# Patient Record
Sex: FEMALE | Race: WHITE
[De-identification: names, ages, dates, MRNs, and addresses within clinical notes are randomized per-mention and may not be internally consistent; named-entity substitution may affect disease eponyms.]

---

## 2017-06-02 ENCOUNTER — HOSPITAL ENCOUNTER (OUTPATIENT)
Dept: HOSPITAL 47 - RADMAMWWP | Age: 62
Discharge: HOME | End: 2017-06-02
Payer: COMMERCIAL

## 2017-06-02 DIAGNOSIS — Z12.31: Primary | ICD-10-CM

## 2017-06-02 PROCEDURE — 77063 BREAST TOMOSYNTHESIS BI: CPT

## 2017-06-05 NOTE — MM
Reason for exam: screening  (asymptomatic).

Last mammogram was performed 2 years and 3 months ago.



History:

Patient is postmenopausal and is nulliparous.

Benign excisional biopsy of the left breast, January 30, 2001.  Excisional biopsy 

of the right breast.

Took hormonal contraceptives for 11 years beginning at age 19.



Physical Findings:

A clinical breast exam by your physician is recommended on an annual basis and 

results should be correlated with mammographic findings.



MG 3D Screening Mammo W/Cad

Bilateral CC and MLO view(s) were taken.

Prior study comparison: March 11, 2015, right breast MG diagnostic mammo RT w CAD.

August 29, 2014, right breast MG work up mamm w CAD RT.

The breast tissue is heterogeneously dense. This may lower the sensitivity of 

mammography.

Finding: There is a questionable 16 mm equal density (isodense), lobulated mass 

located 3 cm from the nipple in the 12 o'clock position of the left breast.

New finding since March 11, 2015 and August 29, 2014.





ASSESSMENT: Incomplete: need additional imaging evaluation, BI-RAD 0



RECOMMENDATION:

Ultrasound of the left breast.



[facility] will attempt to contact patient  to return for ultrasound.

## 2017-06-15 ENCOUNTER — HOSPITAL ENCOUNTER (OUTPATIENT)
Dept: HOSPITAL 47 - RADUSWWP | Age: 62
Discharge: HOME | End: 2017-06-15
Payer: COMMERCIAL

## 2017-06-15 DIAGNOSIS — R92.8: Primary | ICD-10-CM

## 2017-06-15 NOTE — USB
Reason for exam: additional evaluation requested from abnormal screening.



History:

Patient is postmenopausal and is nulliparous.

Benign excisional biopsy of the left breast, January 30, 2001.  Excisional biopsy 

of the right breast.

Took hormonal contraceptives for 11 years beginning at age 19.



Physical Findings:

Nurse did not find any significant physical abnormalities on exam.



US Breast Workup Limited LT

Left breast ultrasound demonstrates no cystic or solid lesion seen. Possible scar 

tissue at palpable consistent with previousl surgery.



These results were verbally communicated with the patient and result sheet given 

to the patient on 6/15/17.





ASSESSMENT: Probably benign, BI-RAD 3



RECOMMENDATION:

Follow-up diagnostic mammogram and ultrasound of the left breast in 6 months.

## 2018-01-22 ENCOUNTER — HOSPITAL ENCOUNTER (OUTPATIENT)
Dept: HOSPITAL 47 - RADMAMWWP | Age: 63
Discharge: HOME | End: 2018-01-22
Payer: COMMERCIAL

## 2018-01-22 DIAGNOSIS — L98.9: ICD-10-CM

## 2018-01-22 DIAGNOSIS — R92.2: Primary | ICD-10-CM

## 2018-01-22 PROCEDURE — 77065 DX MAMMO INCL CAD UNI: CPT

## 2018-01-22 PROCEDURE — 76641 ULTRASOUND BREAST COMPLETE: CPT

## 2018-01-22 NOTE — MM
Reason for exam: follow-up at short interval from prior study.

Last mammogram was performed 8 months ago.



History:

Patient is postmenopausal and is nulliparous.

Benign excisional biopsy of the left breast, January 30, 2001.  Excisional biopsy 

of the right breast.

Took hormonal contraceptives for 11 years beginning at age 19.



Physical Findings:

Nurse did not find any significant physical abnormalities on exam.



MG 3D Diag Mammo W/Cad LT

CC and MLO view(s) were taken of the left breast.

Prior study comparison: June 2, 2017, bilateral MG 3d screening mammo w/cad.  

March 11, 2015, right breast MG diagnostic mammo RT w CAD.

The breast tissue is heterogeneously dense. This may lower the sensitivity of 

mammography.  There is no discrete abnormality including area of concern. Stable 

post operative distortion left breast.



These results were verbally communicated with the patient and result sheet given 

to the patient on 1/22/18.





ASSESSMENT: Incomplete: need additional imaging evaluation, BI-RAD 0



RECOMMENDATION:

Ultrasound of the left breast.

## 2018-01-22 NOTE — USB
Reason for exam: follow-up at short interval from prior study.



History:

Patient is postmenopausal and is nulliparous.

Benign excisional biopsy of the left breast, January 30, 2001.  Excisional biopsy 

of the right breast.

Took hormonal contraceptives for 11 years beginning at age 19.



US Breast LT

Left breast ultrasound includes all four quadrants, the retroareolar region and 

axilla. Finding demonstrates a 5 x 2 x 3mm oval lesion too small to characterize 

at 2 o'clock.



These results were verbally communicated with the patient and result sheet given 

to the patient on 1/22/18.





ASSESSMENT: Benign, BI-RAD 2



RECOMMENDATION:

Return to routine screening mammogram schedule for both breasts.

Back on schedule.

## 2018-10-02 ENCOUNTER — HOSPITAL ENCOUNTER (OUTPATIENT)
Dept: HOSPITAL 47 - RADMAMWWP | Age: 63
Discharge: HOME | End: 2018-10-02
Attending: OBSTETRICS & GYNECOLOGY
Payer: COMMERCIAL

## 2018-10-02 DIAGNOSIS — Z12.31: Primary | ICD-10-CM

## 2018-10-02 PROCEDURE — 77063 BREAST TOMOSYNTHESIS BI: CPT

## 2018-10-02 PROCEDURE — 77067 SCR MAMMO BI INCL CAD: CPT

## 2018-10-04 NOTE — MM
Reason for exam: screening  (asymptomatic).

Last mammogram was performed 8 months ago.



History:

Patient is postmenopausal and is nulliparous.

Benign excisional biopsy of the left breast, January 30, 2001.  Excisional biopsy 

of the right breast.

Took hormonal contraceptives for 11 years beginning at age 19.



Physical Findings:

A clinical breast exam by your physician is recommended on an annual basis and 

results should be correlated with mammographic findings.



MG 3D Screening Mammo W/Cad

Bilateral CC and MLO view(s) were taken.

Prior study comparison: January 22, 2018, left breast MG 3d diag mammo w/cad LT.  

June 2, 2017, bilateral MG 3d screening mammo w/cad.

The breast tissue is heterogeneously dense. This may lower the sensitivity of 

mammography.  Stable calcifications.  No significant changes when compared with 

prior studies.





ASSESSMENT: Benign, BI-RAD 2



RECOMMENDATION:

Routine screening mammogram of both breasts in 1 year.

## 2019-10-28 ENCOUNTER — HOSPITAL ENCOUNTER (OUTPATIENT)
Dept: HOSPITAL 47 - RADMAMWWP | Age: 64
Discharge: HOME | End: 2019-10-28
Attending: OBSTETRICS & GYNECOLOGY
Payer: COMMERCIAL

## 2019-10-28 DIAGNOSIS — Z12.31: Primary | ICD-10-CM

## 2019-10-28 PROCEDURE — 77063 BREAST TOMOSYNTHESIS BI: CPT

## 2019-10-28 PROCEDURE — 77067 SCR MAMMO BI INCL CAD: CPT

## 2019-10-30 NOTE — MM
Reason for exam: screening  (asymptomatic).

Last mammogram was performed 1 year and 1 month ago.



History:

Patient is postmenopausal and is nulliparous.

Benign excisional biopsy of the left breast, January 30, 2001.  Excisional biopsy 

of the right breast.

Took hormonal contraceptives for 11 years beginning at age 19.



Physical Findings:

A clinical breast exam by your physician is recommended on an annual basis and 

results should be correlated with mammographic findings.



MG 3D Screening Mammo W/Cad

Bilateral CC and MLO view(s) were taken.

Prior study comparison: October 2, 2018, bilateral MG 3d screening mammo w/cad.  

January 22, 2018, left breast MG 3d diag mammo w/cad LT.

There is chronic nodularity in the left breast inferiorly. Nodular asymmetric 

density lateral posterior left breast is more defined. Questionable subtle 

distortion 11-12 o'clock right breast.





ASSESSMENT: Incomplete: need additional imaging evaluation, BI-RAD 0



RECOMMENDATION:

Special view mammogram of both breasts.

(3D)





If lesion persists on supplemental views, image directed ultrasound is 

recommended.



[facility] will attempt to contact patient to return for supplemental 

views and ultrasound if indicated.

## 2019-11-07 ENCOUNTER — HOSPITAL ENCOUNTER (OUTPATIENT)
Dept: HOSPITAL 47 - RADMAMWWP | Age: 64
Discharge: HOME | End: 2019-11-07
Attending: OBSTETRICS & GYNECOLOGY
Payer: COMMERCIAL

## 2019-11-07 DIAGNOSIS — R92.8: Primary | ICD-10-CM

## 2019-11-07 PROCEDURE — 77062 BREAST TOMOSYNTHESIS BI: CPT

## 2019-11-07 PROCEDURE — 77066 DX MAMMO INCL CAD BI: CPT

## 2019-11-08 NOTE — USB
Reason for exam: additional evaluation requested from abnormal screening.



History:

Patient is postmenopausal and is nulliparous.

Benign excisional biopsy of the left breast, January 30, 2001.  Excisional biopsy 

of the right breast.

Took hormonal contraceptives for 11 years beginning at age 19.



US Breast Workup Limited ENDY

Left limited breast ultrasound including focal area of concern, retroareolar and 

axilla demonstrates a 4 x 3mm echogenic area within duct at the posterior nipple, 

ultrasound biopsy recommended.



Right limited breast ultrasound including focal area of concern, retroareolar and 

axilla demonstrates no cystic or solid lesion seen. On screening right 3D CC 46/80

on review there are two areas of distortion 1.6cm apart. Stereotactic core biopsy

of more posterior recomended. ML diagnostic 40/78.



These results were verbally communicated with the patient and result sheet given 

to the patient on 11/7/19.





ASSESSMENT: Suspicious, BI-RAD 4



RECOMMENDATION:

Ultrasound core biopsy of the left breast.

Stereotactic core biopsy of the right breast.



Called Dr. Burrows's office with mammographic findings and has scheduled an 

appointment for the patient for 11/14/19 at 4:00 with Dr. Miller.





PRELIMINARY REPORT CALLED AND FAXED TO DR. MILLER ON 11/7/19.

## 2019-11-08 NOTE — MM
Reason for exam: additional evaluation requested from abnormal screening.

Last mammogram was performed less than 1 month ago.



History:

Patient is postmenopausal and is nulliparous.

Benign excisional biopsy of the left breast, January 30, 2001.  Excisional biopsy 

of the right breast.

Took hormonal contraceptives for 11 years beginning at age 19.



Physical Findings:

Nurse did not find any significant physical abnormalities on exam.



MG 3D Work Up W/Cad ENDY

Bilateral spot compression CC and LM view(s) were taken.  Spot compression MLO 

view(s) were taken of the right breast.

Prior study comparison: October 28, 2019, bilateral MG 3d screening mammo w/cad.  

October 2, 2018, bilateral MG 3d screening mammo w/cad.

The breast tissue is heterogeneously dense. This may lower the sensitivity of 

mammography.  Post excisional changes on the left retroareolar and likely also 

upper outer quadrant at posterior depth 12 o'clock, precautionary ultrasound. 6mm 

area of architectural distortion in right upper outer quadrant 6cm from nipple, 

persists on additional views.



These results were verbally communicated with the patient and result sheet given 

to the patient on 11/7/19.





ASSESSMENT: Incomplete: need additional imaging evaluation, BI-RAD 0



RECOMMENDATION:

Ultrasound of both breasts.

(both upper outer quadant)

## 2019-11-14 ENCOUNTER — HOSPITAL ENCOUNTER (OUTPATIENT)
Dept: HOSPITAL 47 - WWCWWP | Age: 64
End: 2019-11-14
Attending: SURGERY
Payer: COMMERCIAL

## 2019-11-14 VITALS
DIASTOLIC BLOOD PRESSURE: 97 MMHG | TEMPERATURE: 98.3 F | SYSTOLIC BLOOD PRESSURE: 165 MMHG | HEART RATE: 87 BPM | RESPIRATION RATE: 18 BRPM

## 2019-11-14 VITALS — BODY MASS INDEX: 28.8 KG/M2

## 2019-11-14 DIAGNOSIS — Z53.9: Primary | ICD-10-CM

## 2019-11-14 NOTE — P.GSHP
History of Present Illness


H&P Date: 11/14/19


Chief Complaint: Abnormal mammogram and ultrasound








Usha is being seen in consultation today for Dr. Kaycee Beckman.  This is for 

bilateral abnormal mammograms and an abnormal right breast ultrasound.


A 64-year-old white female who presents for breast evaluation.  She underwent a 

bilateral screening mammogram on [id number] [id number].  This was felt to be incomplete and

bilateral diagnostic mammograms were recommended.  Bilateral diagnostic 

mammograms were done 02051 and these were felt to be incomplete and bilateral 

ultrasounds were recommended.  On ultrasound she was noted to have the following


Right breast: Focal area of concern retroareolar and axilla demonstrated no cy

stic or solid lesion.  On review there were 2 areas of distortion 1.6 cm apart a

stereotactic core biopsy of the more posterior lesion was recommended.  It was 

felt that a 3-D stereo biopsy should be performed and this is scheduled at [facility] December 7.


 The left breast: 4 x 3 mm echogenic area within a duct at the posterior nipple 

ultrasound biopsy recommended this is scheduled for [facility] on 

the same date December 7.





The patient does not feel anything of concern in her breast.  She has no 

complaints of pain masses nipple discharge or skin changes.


In 1992 an open biopsy was done of the right breast. This was benign.  2001 open

biopsy of the left breast. 2004 left breast of left breast.  All benign. 





Family History:


father: Esophageal cancer





Hormonal History:


menarche: 13


G2Ab2, no children (tubal at 30) 


menopause: 43


BCP: 15 years


hormones: none





Surgical history:


1.  Tubal ligation


2.  Bunionectomy





Medical history:


1.varicose veins








Social History:


smoke: none


alcohol: wine daily


drugs: none 








- Constitutional


Constitutional: Denies chills, Denies fever





- EENT


Eyes: denies blurred vision, denies pain


Ears: bilateral: tinnitus, deny: decreased hearing


Ears, nose, mouth and throat: Denies headache, Denies sore throat





- Breasts


Breasts: bilateral: as per HPI





- Cardiovascular


Cardiovascular: Reports high blood pressure





- Respiratory


Respiratory: Denies cough, Denies 7





- Gastrointestinal


Gastrointestinal: Denies abdominal pain, Denies diarrhea, Denies nausea, Denies 

vomiting





- Genitourinary (Female)


Genitourinary: Denies dysuria, Denies hematuria





- Menstruation


Menstruation: Reports postmenopausal





- Musculoskeletal


Comment: 





arthritis





- Integumentary


Integumentary: Denies pruritus, Denies rash





- Neurological


Neurological: Denies numbness, Denies weakness





- Psychiatric


Psychiatric: Denies anxiety, Denies depression





- Endocrine


Endocrine: Denies fatigue, Denies weight change





- Hematologic/Lymphatic


Comment: 





none





- Allergic/Immunologic


Allergic/Immunologic: Reports seasonal allergies





Past Medical History


History of Any Multi-Drug Resistant Organisms: None Reported


Smoking Status: Former smoker





Medications and Allergies


                                Home Medications











 Medication  Instructions  Recorded  Confirmed  Type


 


Cholecalciferol (Vitamin D3) 2,000 unit PO DAILY 11/14/19 11/14/19 History





[Vitamin D3]    


 


Famotidine 40 mg PO DAILY 11/14/19 11/14/19 History


 


Gabapentin [Neurontin] 300 mg PO TID 11/14/19 11/14/19 History


 


Levothyroxine Sodium [Synthroid] 75 mcg PO DAILY 11/14/19 11/14/19 History


 


Loratadine [Claritin] 10 mg PO DAILY 11/14/19 11/14/19 History


 


Metoprolol Tartrate [Lopressor] 100 mg PO BID 11/14/19 11/14/19 History


 


Milk Thistle 150 mg PO DAILY 11/14/19 11/14/19 History


 


Omega-3 Fatty Acids [Omega-3] 1,000 mg PO DAILY 11/14/19 11/14/19 History


 


Vitamin B Complex 1 each PO DAILY 11/14/19 11/14/19 History


 


Vitamin E 1,000 unit PO DAILY 11/14/19 11/14/19 History


 


traMADol HCL [Ultram] 50 mg PO Q4-6H 11/14/19 11/14/19 History








                                    Allergies











Allergy/AdvReac Type Severity Reaction Status Date / Time


 


No Known Allergies Allergy   Verified 11/14/19 17:04














Surgical - Exam


                                   Vital Signs











Temp Pulse Resp BP Pulse Ox


 


 98.3 F   87   18   165/97   97 


 


 11/14/19 17:01  11/14/19 17:01  11/14/19 17:01  11/14/19 17:01  11/14/19 17:01














- General


well developed, well nourished, no distress





- Eyes


normal ocular movement





- ENT


no hearing loss, no congestion





- Neck


no masses, trachea midline





- Respiratory


normal expansion, normal respiratory effort, clear to auscultation





- Cardiovascular


Rhythm: regular


Heart Sounds: normal: S1, S2





- Abdomen





normal bowel sounds


Abdomen: soft, non tender, no guarding, no rigid, no rebound





- Integumentary


no rash





- Neurologic


no disoriented, no combative





- Musculoskeletal


normal gait





- Psychiatric


oriented to time, oriented to person, oriented to place, speech is normal, 

memory intact





Breast examination:


Right breast: Multiple positional exam no dominant masses or nodules of concern


Right axilla: No adenopathy of concern


Left breast: Well-healed scar from prior surgery no dominant masses or nodules 

of concern


Left axilla: No adenopathy of concern


Bra: 40DD


ptosi grade2/3





Results





Mammogram and ultrasound results reviewed





Assessment and Plan


Assessment: 





Impression:


1.  Abnormal bilateral mammogram


2.  Abnormal right breast ultrasound


3.  Fibrocystic breast changes


4.  Family history of cancer


5. varicose veins


6. HTN





Plan


1. ultrasound core biopsy right breast


2.  Stereotactic core biopsy left breast 1 possibly 2 spots


3.  Medical management of medical conditions


4.  Follow-up after core biopsies performed





We discussed risks and benefits of the procedure.  We've also discussed stopping

 in the aspirin and aspirin products vitamin E or mated 3 Fish oils.  The 

patient understands and the procedure has been scheduled for the near future.





Time 25 minutes, 50% discussion. 





CC: Dr. Beckman

## 2019-12-13 ENCOUNTER — HOSPITAL ENCOUNTER (OUTPATIENT)
Dept: HOSPITAL 47 - WWCWWP | Age: 64
Discharge: HOME | End: 2019-12-13
Attending: SURGERY
Payer: COMMERCIAL

## 2019-12-13 VITALS
SYSTOLIC BLOOD PRESSURE: 134 MMHG | TEMPERATURE: 98 F | RESPIRATION RATE: 18 BRPM | DIASTOLIC BLOOD PRESSURE: 82 MMHG | HEART RATE: 66 BPM

## 2019-12-13 DIAGNOSIS — Z53.9: Primary | ICD-10-CM

## 2019-12-13 NOTE — P.PN
Subjective


Progress Note Date: 12/13/19


Principal diagnosis: 





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

breast


Stage 1A [id number]+/Pr+G2 Her2-


right  breast: Stage IA [id number]+Pr+Her2-G2; left breast: atypia





Usha is a 64-year-old white female who was seen in consultation 11-14-19 for 

radiographic abnormality in both the right and left breast.  She underwent a 

right breast stereotactic core biopsy of 2 areas and [id number].  She underwent an 

ultrasound-guided core biopsy of the left breast on the same day.  The breast 

stereotactic core biopsy revealed in the 11 to 12 o'clock position in the 

posterior region ductal carcinoma in situ, intermediate interposition invasive 

ductal carcinoma.  The radiograph was reviewed with Dr. Alexis and it is felt 

that the area of concern is between 1-2 cm in size.  The left breast 

retroareolar core biopsy revealed atypical ductal hyperplasia.  The patient has 

no complaints related to the biopsy procedure.  The patient is here with her 

 for discussion.





Objective





- Vital Signs


Vital signs: 


                                   Vital Signs











Temp  98.0 F   12/13/19 07:38


 


Pulse  66   12/13/19 07:38


 


Resp  18   12/13/19 07:38


 


BP  134/82   12/13/19 07:38


 


Pulse Ox  97   12/13/19 07:38








                                 Intake & Output











 12/12/19 12/13/19 12/13/19





 18:59 06:59 18:59


 


Weight   86.183 kg














- Exam





BMI 28.1





- Constitutional


General appearance: Present: average body habitus





- EENT


Eyes: Present: EOMI


ENT: Present: hearing grossly normal





- Neck


Neck: Present: normal ROM





- Respiratory


Respiratory: bilateral: CTA





- Cardiovascular


Rhythm: regular


Heart sounds: normal: S1, S2





- Integumentary


Integumentary Comment(s): 





Mild ecchymosis right breast at core biopsy site, no evidence of infection or 

hematoma


Left breast core biopsy site no evidence of infection or hematoma





- Musculoskeletal


Musculoskeletal: Present: gait normal





- Psychiatric


Psychiatric: Present: A&O x's 3, appropriate affect, intact judgment & insight





Assessment and Plan


Assessment: 





Impression:


1.  Right breast core biopsy of 2 sites stereotactic core biopsy posterior 

position ductal carcinoma in situ, anterior position invasive ductal carcinoma


2.  Left breast core biopsy atypia retroareolar region


3.  Family history father esophageal cancer


4.  History of hypertension





 At had a long discussion with the patient and her  regarding treatment 

options.  The lesion in the right breast appears to be less than 2 cm in size 

and she would be a good candidate for a lumpectomy with radiation therapy.  

Options of mastectomy plus or minus reconstruction were also discussed.  We have

also discussed sentinel node biopsy and possible axillary node dissection.  

After discussion they wish to proceed with a lumpectomy, sentinel node biopsy, 

possible axillary node dissection.  We have discussed using an onco plastic 

approach and most likely this will be done via an eccentric mastopexy.


We discussed that there will  be some asymmetry between the breasts.  She will 

also need a left breast needle local excisional biopsy of the area of atypia.  

The right breast will have additional changes after radiation therapy and a 

symmetry procedure on the contralateral side would be delayed until this was don

e.





Plan:


1.  Needle localization lumpectomy right breast with possible tissue transfer, 

sentinel node biopsy, possible axillary node dissection


2.  Needle localization lumpectomy left breast area of atypia


3.  Presentation of case at tumor board


4.  Preoperative medical clearance





Approximately 60 minutes spent in this encounter, greater than 50% planning and 

counseling.





Cc: Dr. Chandra

## 2020-01-15 ENCOUNTER — HOSPITAL ENCOUNTER (OUTPATIENT)
Dept: HOSPITAL 47 - OR | Age: 65
Discharge: HOME | End: 2020-01-15
Attending: SURGERY
Payer: COMMERCIAL

## 2020-01-15 VITALS — BODY MASS INDEX: 28 KG/M2

## 2020-01-15 VITALS — RESPIRATION RATE: 16 BRPM

## 2020-01-15 VITALS — TEMPERATURE: 97.1 F

## 2020-01-15 VITALS — DIASTOLIC BLOOD PRESSURE: 74 MMHG | HEART RATE: 79 BPM | SYSTOLIC BLOOD PRESSURE: 140 MMHG

## 2020-01-15 DIAGNOSIS — Z79.891: ICD-10-CM

## 2020-01-15 DIAGNOSIS — I10: ICD-10-CM

## 2020-01-15 DIAGNOSIS — Z91.048: ICD-10-CM

## 2020-01-15 DIAGNOSIS — K21.9: ICD-10-CM

## 2020-01-15 DIAGNOSIS — Z79.899: ICD-10-CM

## 2020-01-15 DIAGNOSIS — Z79.890: ICD-10-CM

## 2020-01-15 DIAGNOSIS — C50.911: Primary | ICD-10-CM

## 2020-01-15 DIAGNOSIS — Z88.8: ICD-10-CM

## 2020-01-15 PROCEDURE — 88307 TISSUE EXAM BY PATHOLOGIST: CPT

## 2020-01-15 PROCEDURE — 76098 X-RAY EXAM SURGICAL SPECIMEN: CPT

## 2020-01-15 PROCEDURE — 38792 RA TRACER ID OF SENTINL NODE: CPT

## 2020-01-15 PROCEDURE — 88341 IMHCHEM/IMCYTCHM EA ADD ANTB: CPT

## 2020-01-15 PROCEDURE — 19281 PERQ DEVICE BREAST 1ST IMAG: CPT

## 2020-01-15 PROCEDURE — 88342 IMHCHEM/IMCYTCHM 1ST ANTB: CPT

## 2020-01-15 PROCEDURE — 19282 PERQ DEVICE BREAST EA IMAG: CPT

## 2020-01-15 RX ADMIN — HYDROMORPHONE HYDROCHLORIDE PRN MG: 1 INJECTION, SOLUTION INTRAMUSCULAR; INTRAVENOUS; SUBCUTANEOUS at 15:49

## 2020-01-15 RX ADMIN — HYDROMORPHONE HYDROCHLORIDE PRN MG: 1 INJECTION, SOLUTION INTRAMUSCULAR; INTRAVENOUS; SUBCUTANEOUS at 15:55

## 2020-01-15 NOTE — P.NAPBC
NAPBC Queries





- NAPBC Queries


Was patient's case review presented at [facility] tumor board? If no, comment.: Yes


Was patient's pathology reviewed at [facility]? If no, comment.: Yes


Was breast conservation surgery offered? If no, comment.: Yes


Was sentinel node biopsy offered? If no, comment.: Yes


Was diagnosis confirmed by percutaneous core biopsy? If no, comment.: Yes


Is patient mastectomy patient?: No


Was a preop referral to reconstructive surgeon offered?: No (not a mastectomy 

patient)


Clinical Stage: 





stage 1A

## 2020-01-15 NOTE — P.OP
Date of Procedure: 01/15/20


Preoperative Diagnosis: 


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 

carcinoma in situ, and the second area invasive ductal carcinoma


Postoperative Diagnosis: 


same


Procedure(s) Performed: 


Left breast needle local excisional biopsy


Right breast sentinel node biopsy


Right breast needle local times two lumpectomy via donut mastopexy with adjacent

tissue transfer


Anesthesia: JED


Surgeon: Kerline Miller


Estimated Blood Loss (ml): 5


IV fluids (ml): 1,000


Pathology: other (breast tissue right and left breast, sentinal node biopsy)


Condition: stable


Disposition: same day


Indications for Procedure: 


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 

ductal carcinoma


Operative Findings: 


Dense breast tissue


Description of Procedure: 


the patient is a 64-year-old white female who


Mammographic evaluation of the breast was noted to have an area in both rest 

which required biopsy.  In the left breast core biopsy revealed atypia.  In the 

right breast 2 areas of concern were identified when revealed ductal carcinoma 

in situ and the second invasive ductal carcinoma.  The 2 areas in the right 

breast with approximately 1.6 cm apart.


Patient was given risks and benefits of surgery and chose to have a right 

lumpectomy via donor mastopexy and a left needle local excisional biopsy.





The patient was taken to the operating room following needle localization of the

areas of concern in each breast.  Additionally radioactive substance was 

injected in the right periareolar area such that the sentinel node biopsy could 

be performed on the right.


In the operative suite and induction of general anesthesia was initiated.  

Following this the neoprobe was utilized to assure the radioactivity had been 

transmitted to the right axilla.  This was identified.  The breast and the right

axilla were prepped and draped in a sterile fashion.  The left breast was 

approached initially.  A periareolar incision was made and carried down to the 

shaft of the needle and surrounding tissue was excised.  The specimen was inked 

for orientation.  Radiograph revealed the area of concern had been removed.  The

deep tissues were closed with 3-0 Vicryl suture.  The skin was closed with 4-0 

Monocryl.





The right breast and axilla were then approached.  The right axilla was noted to

have an area of increased radioactivity at the medial aspect of the axilla.  An 

incision was made and carried down to the axillary tissue.  Using the Allis 

clamp the area of greatest activity was grasped.  Using the harmonic scalpel 

dissection was performed around this.  The neoprobe was used to test for 

radioactivity and the 10 second count of this lymph node was 4828.  The 10 

second background count was 36.  The wound was examined for hemostasis.  After 

assured that hemostasis was attained the deep tissues were closed with 3-0 

Vicryl suture.  The lymph node was felt to be in the level I area of dissection.

 The skin was closed using 4-0 Monocryl.  Steri-Strips were applied.





The area of the right breast was then approached.  A donut mastopexy 

circumareolar caryn was placed.  A second eccentric surgical caryn was placed 

outside of this [place].  The distance from the periareolar edge to the outer 

[place] was approximately 1 cm except at the superiormost aspect it was 1.8 cm.  

This tissue was de-epithelialized.  Following this the breast parenchyma was 

entered at the superior lateral aspect of the incision.  The breast tissue was 

dissected free in the plane between the subcutaneous tissue and the breast 

tissue.  This was dissected to the area of the clavicle superior and to the 

chest wall medially and laterally.  The wires which had both been inserted 

laterally were identified.  The tissue between these wires was excised using 

sharp dissection.  Posteriorly excision was carried down to the pectoralis major

muscle.  The specimen was painted for orientation.  Radiograph of the specimen 

revealed the area of concern had been removed.   The cavity was marked using 

titanium clips.  Tissue mobilization both medially and laterally was performed 

posteriorly on the pectoralis major muscle.  The area of most mobilization was 

approximately 80 cm squared medially and 80 cm squared laterally.  This was a 

total of 160 cm squared which was mobilized.  The deep tissues were closed in 

the midline using a Vicryl suture.  Following this a 5-0 Thornton-Earl pursestring 

suture was placed around the outer [place]. This was tightened to the correct 

size.  It was a diameter of approximately 5.5 cm.  [place] sutures of 5-0 Thornton-

Earl were placed between the outer and inner [place].   This was followed by a 

running 4-0 Monocryl subcuticular suture.  A 4-0 nylon skin suture was then 

placed.





At the end of the case approximately 20 mL of 1% lidocaine were injected 10 at 

the periareolar incision in the right breast, 5 in the axillary incision, and 5 

in the left breast incision.  A new needle and syringe was used to inject the 

left breast site.





The patient tolerated the procedure in stable condition.  All instrument and 

sponge counts were correct at the end of the case.

## 2020-01-15 NOTE — NM
EXAMINATION TYPE: NM sentinel node injection

 

DATE OF EXAM: 1/15/2020

 

COMPARISON: NONE

 

INDICATION: Abnormal mammogram.

 

Informed consent was obtained.  A timeout was performed.  

 

The area around the right nipple was cleansed with alcohol.  In single subdermal injection, a total o
f 515 uCi Technetium 99m Lymphoseek was injected.  The patient tolerated the procedure very well.  

 

IMPRESSIONS:

 

1. Successful injection for sentinel node evaluation.

## 2020-01-15 NOTE — P.DS
Providers


Attending physician: 


Kerline Miller





Primary care physician: 


Kaycee Beckman








Plan - Discharge Summary


Discharge Rx Participant: No


New Discharge Prescriptions: 


No Action


   traMADol HCL [Ultram] 50 mg PO Q4-6H


   Metoprolol Tartrate [Lopressor] 100 mg PO BID


   Loratadine [Claritin] 10 mg PO DAILY


   Levothyroxine Sodium [Synthroid] 75 mcg PO DAILY


   Gabapentin [Neurontin] 300 mg PO TID


   Famotidine 40 mg PO DAILY


   Vitamin E 1,000 unit PO DAILY


   Vitamin B Complex 1 each PO DAILY


   Omega-3 Fatty Acids [Omega-3] 1,000 mg PO DAILY


   Milk Thistle 150 mg PO DAILY


   Cholecalciferol (Vitamin D3) [Vitamin D3] 2,000 unit PO DAILY


Discharge Medication List





Cholecalciferol (Vitamin D3) [Vitamin D3] 2,000 unit PO DAILY 11/14/19 [History]


Famotidine 40 mg PO DAILY 11/14/19 [History]


Gabapentin [Neurontin] 300 mg PO TID 11/14/19 [History]


Levothyroxine Sodium [Synthroid] 75 mcg PO DAILY 11/14/19 [History]


Loratadine [Claritin] 10 mg PO DAILY 11/14/19 [History]


Metoprolol Tartrate [Lopressor] 100 mg PO BID 11/14/19 [History]


Milk Thistle 150 mg PO DAILY 11/14/19 [History]


Omega-3 Fatty Acids [Omega-3] 1,000 mg PO DAILY 11/14/19 [History]


Vitamin B Complex 1 each PO DAILY 11/14/19 [History]


Vitamin E 1,000 unit PO DAILY 11/14/19 [History]


traMADol HCL [Ultram] 50 mg PO Q4-6H 11/14/19 [History]








Follow up Appointment(s)/Referral(s): 


Kerline Miller MD [STAFF PHYSICIAN] - 1 Week


Activity/Diet/Wound Care/Special Instructions: 


do not drive for 24 hours after discharge or if taking any narcotic pain 

medication


may shower after 48 hours


Wear bra at all times





Discharge Disposition: HOME SELF-CARE

## 2020-01-15 NOTE — MM
EXAMINATION TYPE: MG pre op needle loc LT

 

DATE OF EXAM: 1/15/2020

 

COMPARISON: Mammogram 12/5/2019

 

CLINICAL HISTORY: DCIS and invasive carcinoma right breast, atypical biopsy 
results left breast

 

TECHNIQUE: Needle localization with wire placement and surgical excision of area
of concern of the bilateral breasts

 

FINDINGS: The procedure of needle localization with wire placement and than 
surgical excision was explained to the patient.  Benefits, alternatives, and 
risks were discussed.  An informed consent was then obtained.  

 

A timeout was performed.

 

The shortest pathway for procedure was chosen.  Shortest pathway was lateral 
approach. The overlying skin was prepped in usual sterile fashion.  1% Lidocaine
was used as anesthetic into the skin and subcutaneous tissue up to the level of 
area of concern.  A 7 cm needle was used.  It was placed via a lateral approach 
under mammographic guidance.  Subsequent 90 degrees mammogram show the needle to
be in satisfactory position relative to the targeted area.  At this point, wire 
was placed and the needle was withdrawn.  The wire was fixed to patient's skin. 
Images were marked for surgeon.  

 

The shortest pathway for procedure was chosen.  Shortest pathway was lateral 
approach. The overlying skin was prepped in usual sterile fashion.  1% Lidocaine
was used as anesthetic into the skin and subcutaneous tissue up to the level of 
areas of concern.  A 7 cm needle was used for the more anterior inferior marker 
and 9 cm needle was used for the more posterior superior marker. These were 
placed via a lateral approach under mammographic guidance.  Subsequent 90 
degrees mammogram show the needles to be in satisfactory position relative to 
the targeted areas.  At this point, wire was placed and the needles were 
withdrawn.  The wires were fixed to patient's skin.  Images were marked for 
surgeon.  

 

Images were reviewed with the surgeon prior to surgery in person. 

 

The patient subsequently had sentinel node injection. The patient tolerated the 
procedures well without any immediate complication.  The patient was taken to 
presurgical holding and then to surgery for surgical excision.

 

Specimen: Left breast specimen is obtained. The surgical wire and clip are 
within the specimen.

 

Specimen: Right breast specimen is obtained. Surgical wires and clips are within
the specimen.

 

IMPRESSION:

1. Surgical biopsy left breast.

2. Lumpectomy, 2 sites right breast.

 

Recommendations:

1. Recommendations are pending pathology results.

 

Pathology Results: Malignant



A. RIGHT BREAST/AXILLA, SENTINEL LYMPH NODE, BIOPSY: One lymph node negative for
metastatic adenocarcinoma. Node is examined by routine H+E as well as 
appropriately controlled immunohistochemical stains for cytokeratin 7 and 
epithelial membrane antigen.



B. RIGHT BREAST/AXILLA LYMPH NODE, "LYMPH NODE DISSECTION": Benign fibroadipose 
tissue, negative for lymph node.



C. RIGHT BREAST TISSUE, BIOPSY: Fibrocystic spectrum changes and sclerosing 
adenosis. Negative for invasive or in situ adenocarcinoma.



D. RIGHT BREAST MASS, NEEDLE LOCALIZATION MAMMOGRAPHICALLY ORIENTED LUMPECTOMY: 
Focal infiltrating moderately differentiated adenocarcinoma about 3 mm in 
greatest dimension and Grade 2 duct carcinoma in situ with focal comedo form 
necrosis measuring about 15 x 9 x 6 mm. Black inked superior margin is about 1 
mm away from DCIS, other margins - negative for involvement by adenocarcinoma, 
see note.



E. LEFT BREAST, MAMMOGRAPHICALLY ORIENTED BIOPSY: Focal atypical duct 
hyperplasia. Lesion does not contact inked margins.



Recommendation

Surgical consult

MTDD

## 2020-01-30 ENCOUNTER — HOSPITAL ENCOUNTER (OUTPATIENT)
Dept: HOSPITAL 47 - WWCWWP | Age: 65
Discharge: HOME | End: 2020-01-30
Attending: SURGERY
Payer: COMMERCIAL

## 2020-01-30 VITALS
TEMPERATURE: 98.4 F | SYSTOLIC BLOOD PRESSURE: 154 MMHG | RESPIRATION RATE: 16 BRPM | HEART RATE: 81 BPM | DIASTOLIC BLOOD PRESSURE: 93 MMHG

## 2020-01-30 DIAGNOSIS — Z53.9: Primary | ICD-10-CM

## 2020-01-30 NOTE — P.PN
Subjective


Progress Note Date: 01/30/20


Principal diagnosis: 





right breast STAGE 1 A, left breast atypia





Usha is a 64-year-old white female status post right breast lumpectomy and 

sentinel node biopsy and left breast excisional biopsy the right breast had a 

small focus of invasive ductal carcinoma 3 millimeters as well as DCIS.  The 

margins were negative although the DCIS was closes to the superior margin, less 

than 1 mm.  The patient has no complaints postoperatively.





Objective





- Vital Signs


Vital signs: 


                                   Vital Signs











Temp  98.4 F   01/30/20 16:27


 


Pulse  81   01/30/20 16:27


 


Resp  16   01/30/20 16:27


 


BP  154/93   01/30/20 16:27


 


Pulse Ox  99   01/30/20 16:27








                                 Intake & Output











 01/29/20 01/30/20 01/30/20





 18:59 06:59 18:59


 


Weight   87.09 kg














- Constitutional


General appearance: Present: average body habitus





- EENT


Eyes: Present: EOMI


ENT: Present: hearing grossly normal





- Respiratory


Respiratory: bilateral: CTA





- Cardiovascular


Rhythm: regular


Heart sounds: normal: S1, S2





- Integumentary


Integumentary Comment(s): 





Reason left breast: Clean and dry no evidence of infection


Incisions right breast and axilla clean and dry sutures to be removed








Assessment and Plan


Assessment: 


Impression::


  Patient status post excisional biopsy left breast, right breast lumpectomy and

sentinel node biopsy





Pathology revealed a 3 mm focus of infiltrating ductal carcinoma in the right 

breast with DCIS margin close at less than 1 mm superiorly no tumor on ink


Left breast focal atypical ductal hyperplasia lesion does not contact inked 

margins





The patient is going to follow with medical and radiation oncology.  Sutures to 

be removed.  She will follow her in 3 months.


Time with Patient: Less than 30

## 2020-03-02 ENCOUNTER — HOSPITAL ENCOUNTER (OUTPATIENT)
Dept: HOSPITAL 47 - RADCTMAIN | Age: 65
Discharge: HOME | End: 2020-03-02
Attending: RADIOLOGY
Payer: COMMERCIAL

## 2020-03-02 DIAGNOSIS — C50.411: Primary | ICD-10-CM

## 2020-03-02 DIAGNOSIS — R05: ICD-10-CM

## 2020-03-02 DIAGNOSIS — F17.210: ICD-10-CM

## 2020-03-02 PROCEDURE — 71250 CT THORAX DX C-: CPT

## 2020-03-02 NOTE — CT
EXAMINATION TYPE: CT chest wo con

 

DATE OF EXAM: 3/2/2020

 

COMPARISON: NONE

 

HISTORY: Cough and right sided chest pain. History of right sided breast cancer.

 

CT DLP: 294.3 mGycm.  Automated Exposure Control for Dose Reduction was Utilized.

 

 

TECHNIQUE:  CT scan of the thorax is performed without IV contrast.

 

FINDINGS:

 

LUNGS: Mild biapical pleural/parenchymal scarring. Focal curvilinear and lobulated consolidation in t
he right lower lobe posteriorly just above the diaphragm extending superiorly. There is additional li
near scarring and atelectasis in both bases just above diaphragm. No concerning nodules or masses. No
 pleural effusion or pneumothorax. Mild central peribronchial wall thickening bilaterally.

 

MEDIASTINUM: Lack of IV contrast is noted to limit evaluation for mediastinal and especially hilar ad
enopathy. There are no definitive greater than 1 cm hilar or mediastinal lymph nodes.   No cardiomega
ly or pericardial effusion is seen. Ascending aorta measures up to 3.5 cm diameter image 27.

 

OTHER: Small hiatal hernia is present. Surgical clips right breast from prior lumpectomy seen central
ly. Additional scarring towards the right axilla is present.

 

 

IMPRESSION: Focal curvilinear lobulated consolidation posterior aspect right lower lobe mid to basila
r aspect could reflect focal pneumonia in appropriate clinical setting, correlate clinically.

## 2020-03-11 ENCOUNTER — HOSPITAL ENCOUNTER (OUTPATIENT)
Dept: HOSPITAL 47 - RADBDWWP | Age: 65
Discharge: HOME | End: 2020-03-11
Attending: INTERNAL MEDICINE
Payer: COMMERCIAL

## 2020-03-11 DIAGNOSIS — Z79.890: ICD-10-CM

## 2020-03-11 DIAGNOSIS — M85.80: Primary | ICD-10-CM

## 2020-03-11 DIAGNOSIS — N95.1: ICD-10-CM

## 2020-03-11 PROCEDURE — 77080 DXA BONE DENSITY AXIAL: CPT

## 2020-03-11 NOTE — BD
EXAMINATION TYPE: Axial Bone Density

 

DATE OF EXAM: 3/11/2020

 

COMPARISON: 02/13/2013

 

CLINICAL HISTORY: Z 79.890

 

Height:  67.75

Weight:  195

 

FRAX RISK QUESTIONS:

Alcohol (3 or more units per day):  no

Family History (Parent hip fracture):

Glucocorticoids (More than 3mos):  no

           (Ex: prednisone, prednisolone, methylprednisolone, dexamethasone, and hydrocortisone).    
     

History of Fracture in Adulthood: yes

Secondary Osteoporosis: 

  1.  Type 1 Diabetes: no

  2.  Hyperthyroidism: no

  3.  Menopause before 45: no

  4.  Malnutrition: no

  5.  Chronic liver disease: no

Rheumatoid Arthritis: no

Current Tobacco Use: no

 

RISK FACTORS 

HISTORY OF: 

Family History of Osteoporosis: yes

Active: yes

Diet low in dairy products/other sources of calcium:  at least one serving a day

Postmenopausal woman: yes

Take estrogen and/or progesterone medications: not now

How long: hormonal contraceptives age [age]

Lost more than 2 inches in height since high school: no

Frequent falls: no

Poor Health: no

Hyperparathyroidism: no

Adrenal Insufficiency: no

 

MEDICATIONS: 

Prednisone or other steroids: no

Thyroid Medications:  yes

Which medication: Levothyroxine

How Long: over 10 years

Osteoporosis Medications: no

Additional Medications: calcium, blood pressure med

 

 

 

Additional History: recent breast CA/radiation

 

 

EXAM MEASUREMENTS: 

Bone mineral densitometry was performed using the [employer].

Bone mineral density as measured about the Lumbar spine is:  

----- L1-L4(G/cm2): 1.076

T Score Values are as follows:

----- L2: -1.4

----- L3: -0.9

----- L4: 0.3

----- L1-L4: -0.9

Bone mineral density has: Increased 3.3% since study of: 02/13/2013

 

Bone mineral density about the R hip (g/cm2): 0.852

Bone mineral density about the L hip (g/cm2): 0.845

T Score values are as follows:

-----R Neck: -1.3

-----L Neck: -1.4

-----R Total: -1.1

-----L Total: -0.9

Bone mineral density has: Decreased -4.9% since study of: 02/13/2013

 

 

IMPRESSION:

Osteopenia (T Score between -2.5 and -1).

 

There is slightly increased risk of fracture and the patient may be considered 

for treatment. 

 

Re-Screen 2-5 years.

 

 

 

 

 

NOTE:  T-SCORE=SD OF THE YOUNG ADULT MEAN.

## 2020-05-07 ENCOUNTER — HOSPITAL ENCOUNTER (OUTPATIENT)
Dept: HOSPITAL 47 - WWCWWP | Age: 65
Discharge: HOME | End: 2020-05-07
Attending: SURGERY
Payer: COMMERCIAL

## 2020-05-07 VITALS — TEMPERATURE: 98.8 F | DIASTOLIC BLOOD PRESSURE: 81 MMHG | SYSTOLIC BLOOD PRESSURE: 140 MMHG | HEART RATE: 77 BPM

## 2020-05-07 DIAGNOSIS — Z53.9: Primary | ICD-10-CM

## 2020-05-07 NOTE — P.PN
Subjective


Progress Note Date: 05/07/20


Principal diagnosis: 





right breast Stage 1A [id number]-





    Jyoti is a 64-year-old white female status post right breast lumpectomy 

and sentinel node biopsy done 01/15/2020.  She also had a left breast biopsy 

which revealed focal atypical ductal hyperplasia. She finished her radiation 

treatment on March 19th.  She also saw a medical oncologist and is now on 

Anestrazole. She has had no side effects.  She is not complaining of any hot 

flashes or bony aches. 








Family history:


Father: Esophageal cancer





Hormonal history:


Menarche: 13


Chief to Ab2, no children, tubal ligation at 30


Menopause: 43


Birth control pills: 15 years


Hormones: None





Surgical history:


1.  Tubal ligation


2.  Bunionectomy


3.  Right breast lumpectomy and sentinel node biopsy, left breast biopsy





Medical history:


1.  Varicose veins





Social history:


Smoke: Negative


Alcohol: Wine daily


Drugs: Negative





Review of systems:


Constitutional: Negative


HEENT: Negative


Ears: Tinnitus


Cardiovascular: Hypertension


Respiratory: Negative


GI: Negative


: Status post tubal ligation


Musculoskeletal: Arthritis


Neurologic: Negative


Psychiatric: Negative





Objective





- Constitutional


General appearance: Present: average body habitus





- EENT


Eyes: Present: EOMI


ENT: Present: hearing grossly normal





- Neck


Neck: Present: normal ROM





- Respiratory


Respiratory: bilateral: CTA





- Cardiovascular


Rhythm: regular


Heart sounds: normal: S1, S2





- Gastrointestinal


General gastrointestinal: Present: normal bowel sounds, soft





- Integumentary


Integumentary: Present: normal turgor





- Musculoskeletal


Musculoskeletal: Present: gait normal





- Psychiatric


Psychiatric: Present: A&O x's 3, appropriate affect





- Additional findings


Additional findings: 





breast exam:


inspection: well healed scars from prior surgery





Palpation:


Right breast: Well-healed scar from prior surgery, no evidence of recurrence or 

infection on multiple positional exam postop changes otherwise no dominant 

masses or nodules of concern no lesions of concern


Right axilla: Well-healed scar from sentinel node biopsy no adenopathy of 

concern


Left breast: Well-healed scar from prior biopsy no dominant masses or nodules of

concern


Left axilla: No adenopathy of concern





Assessment and Plan


Assessment: 





Impression:


1.  Patient status post right breast lumpectomy and sentinel node biopsy in 

January 2020 for a stage IA right breast cancer there was a focus of 

microinvasion less than 1 mm from the superior margin and the patient did 

receive additional radiation secondary to this


2.  Patient is presently on anastrozole with no side effects


3.  Patient with no complaints





Plan:


1.  Bilateral mammogram in 6 months with physician exam at that time


2.  Continue anastrozole





CC: Dr. Beckman





encounter 15 minutes > 50% of time in planning and counselling


Time with Patient: Less than 30

## 2020-05-18 ENCOUNTER — HOSPITAL ENCOUNTER (OUTPATIENT)
Dept: HOSPITAL 47 - RADCTMAIN | Age: 65
Discharge: HOME | End: 2020-05-18
Attending: RADIOLOGY
Payer: COMMERCIAL

## 2020-05-18 DIAGNOSIS — C50.411: ICD-10-CM

## 2020-05-18 DIAGNOSIS — Z98.890: ICD-10-CM

## 2020-05-18 DIAGNOSIS — F17.210: ICD-10-CM

## 2020-05-18 DIAGNOSIS — J18.1: Primary | ICD-10-CM

## 2020-05-18 PROCEDURE — 71250 CT THORAX DX C-: CPT

## 2020-05-18 NOTE — CT
EXAMINATION TYPE: CT chest wo con

 

DATE OF EXAM: 5/18/2020

 

COMPARISON: Follow-up chest CT May 2, 2020.

 

HISTORY: Cough. Follow up for fluid in lung per patient. Prior abnormal CT. History of breast cancer.


 

CT DLP: 303.3 mGycm.  Automated Exposure Control for Dose Reduction was Utilized.

 

 

TECHNIQUE:  CT scan of the thorax is performed without IV contrast.

 

FINDINGS:

 

LUNGS: Mild to minimal biapical pleural/parenchymal scarring redemonstrated. Focal curvilinear and lo
bulated consolidation in the right lower lobe posteriorly just above the diaphragm extending superior
ly shows interval improvement but incomplete resolution. In addition there is new peripheral consolid
ation with air bronchograms involving the anterior aspect of the right upper lobe for reference sagit
aaron image 25. Slight right-sided volume loss is still present. There is persistent mild linear scarri
ng and/or atelectasis in both bases just above diaphragm redemonstrated. No concerning new nodules or
 masses. No pleural effusion or pneumothorax is appreciated bilaterally. Mild central peribronchial w
all thickening bilaterally remains present.

 

 

MEDIASTINUM: Lack of IV contrast is noted to limit evaluation for mediastinal and especially hilar ad
enopathy. There are no definitive new greater than 1 cm hilar or mediastinal lymph nodes.   No cardio
megaly or pericardial effusion is seen. 

 

OTHER: Stable small size hiatal hernia. Surgical clips from right breast lumpectomy redemonstrated. E
xaggerated thoracic kyphosis.

 

IMPRESSION: Interval improvement but incomplete resolution of posterior right basilar consolidation a
nd/or atelectasis. New anterior right upper lung consolidation. Cannot exclude pneumonia or infectiou
s etiology but suspect may be related to right breast radiation treatment. Correlate clinically.

## 2020-08-11 ENCOUNTER — HOSPITAL ENCOUNTER (OUTPATIENT)
Dept: HOSPITAL 47 - RADMAMWWP | Age: 65
Discharge: HOME | End: 2020-08-11
Attending: SURGERY
Payer: MEDICARE

## 2020-08-11 DIAGNOSIS — Z08: Primary | ICD-10-CM

## 2020-08-11 DIAGNOSIS — Z85.3: ICD-10-CM

## 2020-08-11 PROCEDURE — 77066 DX MAMMO INCL CAD BI: CPT

## 2020-08-11 PROCEDURE — 77062 BREAST TOMOSYNTHESIS BI: CPT

## 2020-08-11 NOTE — MM
Reason for exam: additional evaluation requested from prior study.

Last mammogram was performed 9 months ago.



History:

Patient is postmenopausal, has history of breast cancer at age 64, and is 

nulliparous.

Malignant MG pre op loc each addl RT of the right breast, January 15, 2020.  

Malignant MG pre op needle loc LT of the left breast, January 15, 2020.  

Lumpectomy of the right breast, January 15, 2020.  Benign excisional biopsy of the

left breast, January 30, 2001.  Excisional biopsy of the right breast.

Took hormonal contraceptives for 11 years beginning at age 19.  Taking 

antineoplastic beginning at age 64.



Physical Findings:

Nurse did not find any significant physical abnormalities on exam.



MG 3D Diag Mammo W/Cad ENDY

Bilateral CC and MLO view(s) were taken.

Prior study comparison: November 7, 2019, bilateral MG 3d work up w/cad ENDY.  

October 28, 2019, bilateral MG 3d screening mammo w/cad.

The breast tissue is heterogeneously dense. This may lower the sensitivity of 

mammography.  Benign appearing calcifications in the left breast. Post operative 

changes on the right breast.



These results were verbally communicated with the patient and result sheet given 

to the patient on 8/11/20.





ASSESSMENT: Benign, BI-RAD 2



RECOMMENDATION:

Follow-up diagnostic mammogram of both breasts in 1 year.

## 2020-08-21 ENCOUNTER — HOSPITAL ENCOUNTER (OUTPATIENT)
Dept: HOSPITAL 47 - WWCWWP | Age: 65
Discharge: HOME | End: 2020-08-21
Attending: SURGERY
Payer: COMMERCIAL

## 2020-08-21 VITALS
TEMPERATURE: 98.9 F | SYSTOLIC BLOOD PRESSURE: 154 MMHG | DIASTOLIC BLOOD PRESSURE: 88 MMHG | RESPIRATION RATE: 20 BRPM | HEART RATE: 63 BPM

## 2020-08-21 DIAGNOSIS — Z53.9: Primary | ICD-10-CM

## 2020-08-21 NOTE — P.PN
Subjective


Progress Note Date: 08/21/20


Principal diagnosis: 





Stage IA right breast cancer


     Jyoti is a 64-year-old white female status post right breast lumpectomy 

and sentinel node biopsy done 01/15/2020.  She also had a left breast biopsy 

which revealed focal atypical ductal hyperplasia. She finished her radiation 

treatment on March 19th.  She also saw a medical oncologist and is now on 

Anestrazole. She has had no side effects.  She is not complaining of any hot 

flashes or bony aches. 


[place] had [place] mammogram [place] on [address] this was benign BIRADS 2.  Of

importance is the fact of 2020 she underwent a CAT scan of the chest which 

revealed incomplete resolution of posterior basilar consolidation and/or 

atelectasis.  The anterior upper lung consolidation.  Could not exclude 

pneumonia or infectious etiology but suspect it may be related to her breast 

radiation treatment and correlation clinically was recommended.  Dr. Mendez 

from radiation oncology is following this, I have spoken to him and at this time

he is not recommending another computed tomography scan





Follow-up.  He will be the one to the order any additional radiographs.


   The patient is not complaining of any lumps masses or nodules in her breast. 

She is not complaining of any nipple discharge or skin changes.


The patient does have asymmetry of her breast secondary to the treatment of the 

right breast cancer.  The size discrepancy makes it difficult to find close to 

fit well.  Additionally the patient has shoulder notching and back pain related 

to the large size of the left breast.





Family history:


Father: Esophageal cancer





Hormonal history:


Menarche: 13


G2 Ab2, no children, tubal ligation at 30


Menopause: 43


Birth control pills: 15 years


Hormones: None





Surgical history:


1.  Tubal ligation


2.  Bunionectomy


3.  Right breast lumpectomy and sentinel node biopsy, left breast biopsy





Medical history:


1.  Varicose veins





Social history:


Smoke: Negative


Alcohol: Wine daily


Drugs: Negative





Review of systems:


Constitutional: Negative


HEENT: Negative


Ears: Tinnitus


Cardiovascular: Hypertension


Respiratory: Negative


GI: Negative


: Status post tubal ligation


Musculoskeletal: Arthritis


Neurologic: Negative


Psychiatric: Negative











Objective





- Vital Signs


Vital signs: 


                                   Vital Signs











Temp  98.9 F   08/21/20 09:11


 


Pulse  63   08/21/20 09:11


 


Resp  20   08/21/20 09:11


 


BP  154/88   08/21/20 09:11


 


Pulse Ox  97   08/21/20 09:11








                                 Intake & Output











 08/20/20 08/21/20 08/21/20





 18:59 06:59 18:59


 


Weight   86.183 kg














- Exam





BMI 28.9





- Constitutional


General appearance: Present: average body habitus





- EENT


Eyes: Present: EOMI


ENT: Present: hearing grossly normal





- Neck


Neck: Present: normal ROM





- Respiratory


Respiratory: bilateral: CTA





- Cardiovascular


Rhythm: regular


Heart sounds: normal: S1, S2





- Gastrointestinal


General gastrointestinal: Present: normal bowel sounds, soft





- Integumentary


Integumentary Comment(s): 





Shoulder notching on the left related to the large size of the left breast


Integumentary: Present: normal turgor





- Musculoskeletal


Musculoskeletal: Present: gait normal





- Psychiatric


Psychiatric: Present: A&O x's 3, appropriate affect, intact judgment & insight





- Additional findings


Additional findings: 





Breast exam:


BRA 38DD


inspection: right breast ptosis grade 1, left breast ptosis 2/3





Palpation


Right breast: Changes related to prior surgery, scar well-healed, multiple 

positional exam no dominant masses or nodules of concern


Right axilla: No adenopathy of concern


Left breast: Multiple positional exam no dominant masses or nodules of concern, 

fibrocystic changes


Left axilla: No adenopathy of concern





Assessment and Plan


Assessment: 





Impression:


1.  Patient status post right breast lumpectomy sentinel node biopsy for stage I

a breast cancer, she has no evidence of recurrent cancer


2.  Patient has asymmetry of the breasts secondary to her right breast treatment

the asymmetry is symptomatic for the patient


3.  High cholesterol


4.  Varicose veins





Plan:


1.  Continue close surveillance of the right breast to ensure no recurrence of 

cancer


2.  Continue to follow with radiation and medical oncology


3.  We have discussed asymmetry procedure for the left breast.  The left nipple 

areolar complexes approximately 2 cm lower on the left than on the right.  We've

discussed a crescent mastopexy however this would not change the size of the 

breast versus a Rich pattern reduction mastopexy.  The patient at this time is 

considering a Wise pattern reduction mastopexy.  We have drawn the lines for 

which surgery would be performed she understands this and she is going to 

consider this.  Patient will follow up in 3 months to discuss this.





Cc: Dr. Beckman





encounter 25 minutes, > 50 % of time in planning and counselling

## 2020-11-12 ENCOUNTER — HOSPITAL ENCOUNTER (OUTPATIENT)
Dept: HOSPITAL 47 - WWCWWP | Age: 65
Discharge: HOME | End: 2020-11-12
Attending: SURGERY
Payer: MEDICARE

## 2020-11-12 VITALS
SYSTOLIC BLOOD PRESSURE: 146 MMHG | DIASTOLIC BLOOD PRESSURE: 92 MMHG | RESPIRATION RATE: 18 BRPM | HEART RATE: 68 BPM | TEMPERATURE: 98.6 F

## 2020-11-12 DIAGNOSIS — Z53.9: Primary | ICD-10-CM

## 2020-11-12 NOTE — P.PN
Subjective


Progress Note Date: 11/12/20


Principal diagnosis: 





Asymmetry of the breast related to prior right breast lumpectomy/radiation 

therapy for stage IA invasive ductal carcinoma


Stage IA right breast cancer


     Jyoti is a 64-year-old white female status post right breast lumpectomy 

and sentinel node biopsy done 01/15/2020.  She also had a left breast biopsy 

which revealed focal atypical ductal hyperplasia. She finished her radiation 

treatment on March 19th.  She also saw a medical oncologist and is now on A

nestrazole. She has had no side effects.  She is not complaining of any hot 

flashes or bony aches. 


She had [place] mammogram [place] on [address] this was benign BIRADS 2. 


 Of importance is the fact of 2020 she underwent a CAT scan of the chest which 

revealed incomplete resolution of posterior basilar consolidation and/or 

atelectasis.  The anterior upper lung consolidation.  Could not exclude 

pneumonia or infectious etiology but suspect it may be related to her breast 

radiation treatment and correlation clinically was recommended.  Dr. Mendez 

from radiation oncology is following this, I have spoken to him and at this time

he is not recommending another computed tomography scan





Follow-up.  He will be the one to the order any additional radiographs.





   The patient is not complaining of any lumps masses or nodules in her breast. 

She is not complaining of any nipple discharge or skin changes.





The patient does have asymmetry of her breast secondary to the treatment of the 

right breast cancer.  The size discrepancy makes it difficult to find close to 

fit well.  Additionally the patient has shoulder notching and back pain related 

to the large size of the left breast.





Family history:


Father: Esophageal cancer





Hormonal history:


Menarche: 13


G2 Ab2, no children, tubal ligation at 30


Menopause: 43


Birth control pills: 15 years


Hormones: None





Surgical history:


1.  Tubal ligation


2.  Bunionectomy


3.  Right breast lumpectomy and sentinel node biopsy, left breast biopsy





Medical history:


1.  Varicose veins





Social history:


Smoke: Negative


Alcohol: Wine daily


Drugs: Negative





Review of systems:


Constitutional: Negative


HEENT: Negative


Ears: Tinnitus


Cardiovascular: Hypertension


Respiratory: Negative


GI: Negative


: Status post tubal ligation


Musculoskeletal: Arthritis


Neurologic: Negative


Psychiatric: Negative











Objective





- Vital Signs


Vital signs: 


                                   Vital Signs











Temp  98.6 F   11/12/20 12:43


 


Pulse  68   11/12/20 12:43


 


Resp  18   11/12/20 12:43


 


BP  146/92   11/12/20 12:43


 


Pulse Ox  97   11/12/20 12:43








                                 Intake & Output











 11/11/20 11/12/20 11/12/20





 18:59 06:59 18:59


 


Weight   86.183 kg














- Exam





BMI 28.9





- Constitutional


General appearance: Present: average body habitus





- EENT


Eyes: Present: EOMI


ENT: Present: hearing grossly normal





- Neck


Neck: Present: normal ROM





- Respiratory


Respiratory: bilateral: CTA





- Cardiovascular


Rhythm: regular


Heart sounds: normal: S1, S2





- Gastrointestinal


General gastrointestinal: Present: normal bowel sounds, soft





- Integumentary


Integumentary: Present: normal turgor





- Musculoskeletal


Musculoskeletal: Present: gait normal





- Psychiatric


Psychiatric: Present: A&O x's 3, appropriate affect





- Additional findings


Additional findings: 





Breast Exam:


BRA 38DD


inspection: Breast smaller than left breast, right breast grade 2 ptosis, left 

breast grade 3 ptosis


Palpation:


Right breast: multipositional  exam Incisions clean and dry well-healed no 

evidence of recurrent cancer, postop changes fibrocystic changes


Right axilla: No adenopathy of concern


Left breast: Multiple positional exam fibrocystic changes no dominant masses or 

nodules of concern line left axilla: No adenopathy of concern


Left axilla: No adenopathy of concern





Assessment and Plan


Assessment: 





Impression:


1. Stage IA right breast cancer status post lumpectomy/radiation therapy no 

evidence of recurrent cancer


2.  Asymmetry of the breast





Plan:


1.  Wise pattern reduction mammoplasty secondary to asymmetry of the breast 

related to right breast cancer treatment


2.  Medical clearance


3.  Clearance regarding computed tomography scan changes of lung


4.  Continue to follow with medical and radiation oncology





CC: Dr. Beckman





encounter 25 minutes, > 50% of time in planning and counselling





Discussed asymmetry procedure for the left breast.  The left nipple areolar 

complex is approximately 2 cm lower on the left than the right.  We discussed 

the crescent mastopexy vs a Wise pattern reduction mammoplasty.  The patient at 

this time would like to have a Wise pattern reduction mammoplasty performed.  

She understands the risks and benefits and wishes to proceed.  She's been given 

the option of seeing a plastic surgeon and declined.  She understands of the 

breast will not be exactly the same size but will be closer in size.  Additional

risks include but are not limited to bleeding, infection, reaction to the 

anesthetic, she may have some decreased sensation to the nipple areolar complex 

or necrosis of the complex.  She understands and wishes to proceed.

## 2020-11-24 ENCOUNTER — HOSPITAL ENCOUNTER (OUTPATIENT)
Dept: HOSPITAL 47 - RADCTMAIN | Age: 65
Discharge: HOME | End: 2020-11-24
Attending: RADIOLOGY
Payer: MEDICARE

## 2020-11-24 DIAGNOSIS — Z92.3: ICD-10-CM

## 2020-11-24 DIAGNOSIS — C50.411: Primary | ICD-10-CM

## 2020-11-24 DIAGNOSIS — F17.210: ICD-10-CM

## 2020-11-24 DIAGNOSIS — R05: ICD-10-CM

## 2020-11-24 DIAGNOSIS — Z98.890: ICD-10-CM

## 2020-11-24 PROCEDURE — 71250 CT THORAX DX C-: CPT

## 2020-11-24 NOTE — CT
EXAMINATION TYPE: CT chest wo con

 

DATE OF EXAM: 11/24/2020

 

COMPARISON: 5/18/2020

 

HISTORY: Right breast malignancy

 

CT DLP: 251.90 mGycm

 

Unenhanced CT of the chest was performed with lung and mediastinal window settings submitted.  The la
ck of contrast limits evaluation of the vascular, mediastinal and parenchymal structures including th
e upper abdomen.

 

LUNGS: Improved pleural thickening right upper lobe and interstitial changes from  radiation therapy 
to the right breast. There is hyperinflation seen compatible with COPD. Scattered parenchymal scarrin
g. No infiltrate seen.

 

MEDIASTINUM/LELIA:  Thoracic aorta is of normal caliber with limited evaluation given lack of contrast
.  The heart is not enlarged.  No evidence for mediastinal mass.  No  lymph nodes greater than 1cm.

 

UPPER ABDOMEN:  No significant abnormality is seen.

 

OTHER: Postoperative changes right breast.

 

IMPRESSION:

 

1.    Improved pleural thickening right upper lobe and interstitial changes from  radiation therapy t
o the right breast

## 2020-12-01 ENCOUNTER — HOSPITAL ENCOUNTER (OUTPATIENT)
Dept: HOSPITAL 47 - OR | Age: 65
Discharge: HOME | End: 2020-12-01
Attending: SURGERY
Payer: MEDICARE

## 2020-12-01 VITALS — SYSTOLIC BLOOD PRESSURE: 112 MMHG | HEART RATE: 71 BPM | DIASTOLIC BLOOD PRESSURE: 70 MMHG

## 2020-12-01 VITALS — RESPIRATION RATE: 16 BRPM

## 2020-12-01 VITALS — BODY MASS INDEX: 28.8 KG/M2

## 2020-12-01 VITALS — TEMPERATURE: 96.9 F

## 2020-12-01 DIAGNOSIS — Z80.0: ICD-10-CM

## 2020-12-01 DIAGNOSIS — Z79.890: ICD-10-CM

## 2020-12-01 DIAGNOSIS — Z85.3: ICD-10-CM

## 2020-12-01 DIAGNOSIS — R00.0: ICD-10-CM

## 2020-12-01 DIAGNOSIS — I10: ICD-10-CM

## 2020-12-01 DIAGNOSIS — I83.90: ICD-10-CM

## 2020-12-01 DIAGNOSIS — Z92.3: ICD-10-CM

## 2020-12-01 DIAGNOSIS — Z91.048: ICD-10-CM

## 2020-12-01 DIAGNOSIS — Z88.8: ICD-10-CM

## 2020-12-01 DIAGNOSIS — G47.00: ICD-10-CM

## 2020-12-01 DIAGNOSIS — N64.89: Primary | ICD-10-CM

## 2020-12-01 DIAGNOSIS — Z87.891: ICD-10-CM

## 2020-12-01 DIAGNOSIS — Z98.51: ICD-10-CM

## 2020-12-01 DIAGNOSIS — E78.5: ICD-10-CM

## 2020-12-01 DIAGNOSIS — Z79.891: ICD-10-CM

## 2020-12-01 DIAGNOSIS — Z79.811: ICD-10-CM

## 2020-12-01 DIAGNOSIS — H93.19: ICD-10-CM

## 2020-12-01 DIAGNOSIS — G25.81: ICD-10-CM

## 2020-12-01 DIAGNOSIS — Z82.49: ICD-10-CM

## 2020-12-01 DIAGNOSIS — Z79.899: ICD-10-CM

## 2020-12-01 DIAGNOSIS — M19.90: ICD-10-CM

## 2020-12-01 DIAGNOSIS — Z98.890: ICD-10-CM

## 2020-12-01 DIAGNOSIS — E07.9: ICD-10-CM

## 2020-12-01 PROCEDURE — 19318 BREAST REDUCTION: CPT

## 2020-12-01 PROCEDURE — 88305 TISSUE EXAM BY PATHOLOGIST: CPT

## 2020-12-01 NOTE — P.OP
Date of Procedure: 12/01/20


Preoperative Diagnosis: 


Asymmetry of the breast secondary to prior right partial mastectomy for 

carcinoma


Postoperative Diagnosis: 


Same


Procedure(s) Performed: 


Left breast reduction mammoplasty


Anesthesia: JED


Surgeon: Kerline Miller


Estimated Blood Loss (ml): 20


IV fluids (ml): 1,000


Pathology: other (breast tissue)


Condition: stable


Disposition: same day


Indications for Procedure: 


Asymmetry of the breast secondary to a right partial mastectomy for carcinoma


Operative Findings: 


Dense breast tissue


Description of Procedure: 


The patient is a 65-year-old white female status post a right partial mastectomy

invasive ductal carcinoma.  Following partial mastectomy and radiation treatment

she developed asymmetry of the breast.  She was seen preoperatively and after 

discussion of options she initially reduction mammoplasty on the left side to be

performed.  In the  preoperative area markings for a Wise pattern reduction 

mammoplasty were performed.  The nipple location on the right was approximately 

25.5 cm from the sternal notch  it was measured to the same distance on the 

left.  The V on the left was approximately 7-1/2 cm from the nipple location.  

The areolar was measured to be approximately 2 cm above the nipple location.  

After the markings were placed the patient was taken to the operating room.


In the operating room the right and left breast were prepped and draped in a thomas

rile fashion.  The markings were reinforced in the operating room.  An 8 

centimeter inferior pedicle was marked and this tissue was de-epithelialized.  

Following this the other skin incisions were made and resection of breast tissue

both medially and laterally and in a horseshoe fashion around the areola was 

performed.  This tissue was weighed at 170 g. 


Following this skin flaps were developed medially, laterally and superiorly.  

After this had been accomplished the skin in the area for the new nipple areolar

location was removed.  


     The wound was evaluated and well irrigated.  No evidence of bleeding was 

identified.  Surgicel in powder form was placed.  The inferior pedicle was 

secured to the pectoralis muscle using 3-0 Vicryl suture.  Following this the 

limbs of the V incision were brought together.  These were secured using 3-0 

Vicryl suture.  The inferior mammary incisioin was brought together with 3-0 

Vicryl suture as well.  The areolar complex was secured in place using 3-0 

Vicryl suture.  The subcuticular tissues were then closed with 4-0 Monocryl.  

This was followed by a 4-0 nylon skin suture.  The patient tolerated the 

procedure in stable condition.  All instrument and sponge counts were correct at

the end of the case.  Evaluation with the patient sitting up was performed and 

the nipple areolar complex appeared to be at the same level bilaterally with 

good symmetry of the breast.  The patient tolerated the procedure in stable 

condition.

## 2020-12-01 NOTE — P.DS
Providers


Attending physician: 


Kerline Miller





Primary care physician: 


Kaycee Beckman








Plan - Discharge Summary


Discharge Rx Participant: No


New Discharge Prescriptions: 


No Action


   traMADol HCL [Ultram] 50 mg PO Q4-6H PRN


     PRN Reason: Pain


   Metoprolol Tartrate [Lopressor] 100 mg PO BID


   Levothyroxine Sodium [Synthroid] 75 mcg PO DAILY


   Famotidine 40 mg PO DAILY


   Vitamin E 1,000 unit PO DAILY


   Vitamin B Complex 1 each PO DAILY


   Omega-3 Fatty Acids [Omega-3] 1,000 mg PO DAILY


   Milk Thistle 150 mg PO DAILY


   Cholecalciferol (Vitamin D3) [Vitamin D3] 2,000 unit PO DAILY


   Anastrozole [Arimidex] 1 mg PO DAILY


   Rosuvastatin [Crestor] 5 mg PO QAM


   Gabapentin 300 mg PO TID


   Calcium Carbonate [Calcium] 600 mg PO DAILY


Discharge Medication List





Cholecalciferol (Vitamin D3) [Vitamin D3] 2,000 unit PO DAILY 11/14/19 [History]


Famotidine 40 mg PO DAILY 11/14/19 [History]


Levothyroxine Sodium [Synthroid] 75 mcg PO DAILY 11/14/19 [History]


Metoprolol Tartrate [Lopressor] 100 mg PO BID 11/14/19 [History]


Milk Thistle 150 mg PO DAILY 11/14/19 [History]


Omega-3 Fatty Acids [Omega-3] 1,000 mg PO DAILY 11/14/19 [History]


Vitamin B Complex 1 each PO DAILY 11/14/19 [History]


Vitamin E 1,000 unit PO DAILY 11/14/19 [History]


traMADol HCL [Ultram] 50 mg PO Q4-6H PRN 11/14/19 [History]


Anastrozole [Arimidex] 1 mg PO DAILY 05/07/20 [History]


Rosuvastatin [Crestor] 5 mg PO QAM 08/21/20 [History]


Calcium Carbonate [Calcium] 600 mg PO DAILY 11/25/20 [History]


Gabapentin 300 mg PO TID 11/25/20 [History]








Follow up Appointment(s)/Referral(s): 


Kerline Miller MD [STAFF PHYSICIAN] - 1 Week


Activity/Diet/Wound Care/Special Instructions: 


do not drive until seen by Dr. Elise


wear bra at all times


 may shower at 48 hours


Discharge Disposition: HOME SELF-CARE

## 2020-12-10 ENCOUNTER — HOSPITAL ENCOUNTER (OUTPATIENT)
Dept: HOSPITAL 47 - WWCWWP | Age: 65
Discharge: HOME | End: 2020-12-10
Attending: SURGERY
Payer: MEDICARE

## 2020-12-10 VITALS
DIASTOLIC BLOOD PRESSURE: 91 MMHG | RESPIRATION RATE: 18 BRPM | SYSTOLIC BLOOD PRESSURE: 153 MMHG | HEART RATE: 77 BPM | TEMPERATURE: 98.1 F

## 2020-12-10 DIAGNOSIS — Z53.9: Primary | ICD-10-CM

## 2020-12-10 NOTE — P.PN
Progress Note - Text


Progress Note Date: 12/10/20





     Jyoti is a 65 -year-old female status post reduction of the left breast 

on 12120 related to asymmetry for a surgical procedure on the right breast for

invasive ductal carcinoma.  The patient post procedure is doing well with no 

complaints.





Physical exam:


Lungs: Clear


Heart: Regular rate and rhythm


Incisions: Clean and dry, mild ecchymosis resolving





Impression/Plan:


 Patient doing well status post reduction mammoplasty left breast will plan to 

remove sutures next week

## 2020-12-17 ENCOUNTER — HOSPITAL ENCOUNTER (OUTPATIENT)
Dept: HOSPITAL 47 - WWCWWP | Age: 65
Discharge: HOME | End: 2020-12-17
Attending: SURGERY
Payer: MEDICARE

## 2020-12-17 VITALS
DIASTOLIC BLOOD PRESSURE: 83 MMHG | RESPIRATION RATE: 18 BRPM | TEMPERATURE: 98.5 F | SYSTOLIC BLOOD PRESSURE: 146 MMHG | HEART RATE: 72 BPM

## 2020-12-17 DIAGNOSIS — Z53.9: Primary | ICD-10-CM

## 2021-01-08 ENCOUNTER — HOSPITAL ENCOUNTER (OUTPATIENT)
Dept: HOSPITAL 47 - WWCWWP | Age: 66
Discharge: HOME | End: 2021-01-08
Attending: SURGERY
Payer: MEDICARE

## 2021-01-08 VITALS
SYSTOLIC BLOOD PRESSURE: 146 MMHG | RESPIRATION RATE: 18 BRPM | HEART RATE: 76 BPM | DIASTOLIC BLOOD PRESSURE: 83 MMHG | TEMPERATURE: 98 F

## 2021-01-08 DIAGNOSIS — Z53.9: Primary | ICD-10-CM

## 2021-01-08 NOTE — P.PN
Subjective


Progress Note Date: 01/08/21


Principal diagnosis: 





Right breast stage IA invasive ductal carcinoma, lumpectomy done January 2020, 

patient for contralateral symmetry procedure of the left breast performed in 

December 2020





Stage IA right breast cancer


     Jyoti is a 64-year-old white female status post right breast lumpectomy 

and sentinel node biopsy done 01/15/2020.  She also had a left breast biopsy 

which revealed focal atypical ductal hyperplasia. She finished her radiation 

treatment on March 19th.  She also saw a medical oncologist and is now on 

Anestrazole. She has had no side effects.  She is not complaining of any hot 

flashes or bony aches. 


She had a [place] mammogram performed on [address] this was benign BIRADS 2. 


 Of importance is the fact of 2020 she underwent a CAT scan of the chest which 

revealed incomplete resolution of posterior basilar consolidation and/or 

atelectasis.  The anterior upper lung consolidation.  Could not exclude 

pneumonia or infectious etiology but suspect it may be related to her breast 

radiation treatment and correlation clinically was recommended.








   The patient is not complaining of any lumps masses or nodules in her breast. 

She is not complaining of any nipple discharge or skin changes.





She has a persistent area at the trifurcation of the incision with a small 

amount of granulation tissue.  This appears to be healing well though with no 

evidence of any infection.








Family history:


Father: Esophageal cancer





Hormonal history:


Menarche: 13


G2 Ab2, no children, tubal ligation at 30


Menopause: 43


Birth control pills: 15 years


Hormones: None





Surgical history:


1.  Tubal ligation


2.  Bunionectomy


3.  Right breast lumpectomy and sentinel node biopsy, left breast biopsy


4.  Left breast reduction mammoplasty





Medical history:


1.  Varicose veins





Social history:


Smoke: Negative


Alcohol: Wine daily


Drugs: Negative





Review of systems:


Constitutional: Negative


HEENT: Negative


Ears: Tinnitus


Cardiovascular: Hypertension


Respiratory: Negative


GI: Negative


: Status post tubal ligation


Musculoskeletal: Arthritis


Neurologic: Negative


Psychiatric: Negative











Objective





- Vital Signs


Vital signs: 


                                   Vital Signs











Temp  98.0 F   01/08/21 15:21


 


Pulse  76   01/08/21 15:21


 


Resp  18   01/08/21 15:21


 


BP  146/83   01/08/21 15:21


 


Pulse Ox  96   01/08/21 15:21








                                 Intake & Output











 01/07/21 01/08/21 01/08/21





 18:59 06:59 18:59


 


Weight   86.183 kg














- Exam





BMI 28.9





- Constitutional


General appearance: Present: average body habitus





- EENT


Eyes: Present: EOMI


ENT: Present: hearing grossly normal





- Neck


Neck: Present: normal ROM





- Respiratory


Respiratory: bilateral: CTA





- Cardiovascular


Rhythm: regular


Heart sounds: normal: S1, S2





- Integumentary


Integumentary: Present: normal turgor





- Musculoskeletal


Musculoskeletal: Present: gait normal





- Psychiatric


Psychiatric: Present: A&O x's 3, appropriate affect





- Additional findings


Additional findings: 





Left breast evaluation is performed


Incisions are clean and dry at the trifurcation of the incisions there is a 

small area of granulation tissue which is healing without difficulty noted 

evidence of any infection


 right Breast examination was done in the recent past and was not repeated today





Assessment and Plan


Assessment: 





Impression:


1.  Patient status post right breast lumpectomy and radiation therapy for stage 

IA invasive ductal carcinoma


2.  Patient status post left breast reduction mammoplasty





Plan:


1.  Bilateral mammogram in August 2021


2.  Follow-up examination here in 3 months





Patient is going on a trip to [place] and she will follow-up when she comes back

to assure that the left breast incision is well-healed





Cc: Dr. Beckman





encounter 15 minutes, > 50% of time in planing and counselling

## 2021-04-15 ENCOUNTER — HOSPITAL ENCOUNTER (OUTPATIENT)
Dept: HOSPITAL 47 - WWCWWP | Age: 66
End: 2021-04-15
Attending: SURGERY
Payer: MEDICARE

## 2021-04-15 VITALS
DIASTOLIC BLOOD PRESSURE: 95 MMHG | SYSTOLIC BLOOD PRESSURE: 164 MMHG | RESPIRATION RATE: 18 BRPM | TEMPERATURE: 98.4 F | HEART RATE: 68 BPM

## 2021-04-15 DIAGNOSIS — Z98.890: ICD-10-CM

## 2021-04-15 DIAGNOSIS — Z79.811: ICD-10-CM

## 2021-04-15 DIAGNOSIS — Z92.3: ICD-10-CM

## 2021-04-15 DIAGNOSIS — Z98.86: ICD-10-CM

## 2021-04-15 DIAGNOSIS — C50.911: Primary | ICD-10-CM

## 2021-04-15 NOTE — P.PN
Subjective


Progress Note Date: 04/15/21


Principal diagnosis: 





right bresat stage IA cancer, left breast mammoplasty


Right breast stage IA invasive ductal carcinoma, lumpectomy done January 2020, 

patient for contralateral symmetry procedure of the left breast performed in 

December 2020





Stage IA right breast cancer


     Jyoti is a 64-year-old white female status post right breast lumpectomy 

and sentinel node biopsy done 01/15/2020.  She also had a left breast biopsy 

which revealed focal atypical ductal hyperplasia. She finished her radiation 

treatment on March 19th.  She also saw a medical oncologist and is now on 

Anestrazole. She has had no side effects.  She is not complaining of any hot 

flashes or bony aches. 


She had a bilateral mammogram [place] on [address] this was benign BIRADS 2. 


 Of importance is the fact of 2020 she underwent a CAT scan of the chest which 

revealed incomplete resolution of posterior basilar consolidation and/or 

atelectasis.  The anterior upper lung consolidation.  Could not exclude 

pneumonia or infectious etiology but suspect it may be related to her breast 

radiation treatment and correlation clinically was recommended.





She had a repeat CT scan on November 24th which revealed improved pleural 

thickening right upper lobe and interstitial changes from radiation therapy to 

the right breast were noted.  This is also being followed by radiation oncology.








   The patient notes a small amount of nodularity at the 12 o'clock position of 

the left breast since the mammoplasty.  She is not complaining of any nipple 

discharge or skin changes.





She had a persistent area at the trifurcation of the incision with a small 

amount of granulation tissue which has completely healed at this time. 








Family history:


Father: Esophageal cancer





Hormonal history:


Menarche: 13


G2 Ab2, no children, tubal ligation at 30


Menopause: 43


Birth control pills: 15 years


Hormones: None





Surgical history:


1.  Tubal ligation


2.  Bunionectomy


3.  Right breast lumpectomy and sentinel node biopsy, left breast biopsy


4.  Left breast reduction mammoplasty





Medical history:


1.  Varicose veins





Social history:


Smoke: Negative


Alcohol: Wine daily


Drugs: Negative





Review of systems:


Constitutional: Negative


HEENT: Negative


Ears: Tinnitus


Cardiovascular: Hypertension


Respiratory: Negative


GI: Negative


: Status post tubal ligation


Musculoskeletal: Arthritis


Neurologic: Negative


Psychiatric: Negative














Objective





- Constitutional


General appearance: Present: cooperative





- EENT


Eyes: Present: EOMI


ENT: Present: hearing grossly normal





- Neck


Neck: Present: normal ROM





- Respiratory


Respiratory: bilateral: CTA





- Cardiovascular


Rhythm: regular


Heart sounds: normal: S1, S2





- Integumentary


Integumentary Comment(s): 





Incision left breast clean and dry, no granulation tissue looks completely 

healed at this time


On examination there is some postoperative changes at the 12 o'clock position 

which are non-worrisome





- Psychiatric


Psychiatric: Present: A&O x's 3





Assessment and Plan


Assessment: 





Impression:


1.  Patient status post right breast lumpectomy and sentinel node biopsy for 

stage I a breast cancer


2.  Patient on anastrozole


2.  Patient status post radiation therapy


4.  Patient status post left breast contralateral mammoplasty he was brought at 

this time


5.  review of CT scan done November 2020 done





Plan:


1.  Bilateral mammogram in August 2021 with appointment following that


2.  Patient to follow up sooner if any questions or concerns


2.  Continue anastrozole


4.  Continue follow-up with medical oncology


5.  Continue follow-up with radiation oncology


6.  Continue follow-up as per radiation oncology regarding CT findings of the 

chest





CC: Dr. Beckman

## 2021-08-18 ENCOUNTER — HOSPITAL ENCOUNTER (OUTPATIENT)
Dept: HOSPITAL 47 - RADMAMWWP | Age: 66
Discharge: HOME | End: 2021-08-18
Attending: SURGERY
Payer: MEDICARE

## 2021-08-18 DIAGNOSIS — R92.2: Primary | ICD-10-CM

## 2021-08-18 DIAGNOSIS — Z85.3: ICD-10-CM

## 2021-08-18 DIAGNOSIS — Z78.0: ICD-10-CM

## 2021-08-18 PROCEDURE — 77066 DX MAMMO INCL CAD BI: CPT

## 2021-08-18 PROCEDURE — 77062 BREAST TOMOSYNTHESIS BI: CPT

## 2021-08-18 NOTE — MM
Reason for exam: additional evaluation requested from prior study.

Last mammogram was performed 1 year ago.



History:

Patient is postmenopausal, has history of breast cancer at age 64, and is 

nulliparous.

Reduction of the left breast, December 2020.  Malignant MG pre op loc each addl RT

of the right breast, January 15, 2020.  Malignant MG pre op needle loc LT of the 

left breast, January 15, 2020.  Lumpectomy of the right breast, January 15, 2020. 

Radiation therapy of the right breast, 2020.  Benign excisional biopsy of the 

left breast, January 30, 2001.  Excisional biopsy of the right breast.

Took hormonal contraceptives for 11 years beginning at age 19.  Taking 

antineoplastic beginning at age 64.



Physical Findings:

Nurse did not find any significant physical abnormalities on exam.



MG 3D Diag Mammo W/Cad ENDY

Bilateral CC and MLO view(s) were taken.

Prior study comparison: August 11, 2020, bilateral MG 3d diag mammo w/cad ENDY.  

November 7, 2019, bilateral MG 3d work up w/cad ENDY.

The breast tissue is heterogeneously dense. This may lower the sensitivity of 

mammography.  Stable post operative lumpectomy change. Post reduction changes left

breast.



These results were verbally communicated with the patient and result sheet given 

to the patient on 8/18/21.





ASSESSMENT: Benign, BI-RAD 2



RECOMMENDATION:

Follow-up diagnostic mammogram of both breasts in 1 year.

## 2021-08-26 ENCOUNTER — HOSPITAL ENCOUNTER (OUTPATIENT)
Dept: HOSPITAL 47 - WWCWWP | Age: 66
End: 2021-08-26
Attending: SURGERY
Payer: MEDICARE

## 2021-08-26 VITALS
RESPIRATION RATE: 16 BRPM | DIASTOLIC BLOOD PRESSURE: 84 MMHG | SYSTOLIC BLOOD PRESSURE: 145 MMHG | TEMPERATURE: 98.4 F | HEART RATE: 68 BPM

## 2021-08-26 DIAGNOSIS — Z91.041: ICD-10-CM

## 2021-08-26 DIAGNOSIS — Z98.82: ICD-10-CM

## 2021-08-26 DIAGNOSIS — M25.861: ICD-10-CM

## 2021-08-26 DIAGNOSIS — Z79.811: ICD-10-CM

## 2021-08-26 DIAGNOSIS — Z92.3: ICD-10-CM

## 2021-08-26 DIAGNOSIS — M25.862: ICD-10-CM

## 2021-08-26 DIAGNOSIS — M25.852: ICD-10-CM

## 2021-08-26 DIAGNOSIS — M25.851: ICD-10-CM

## 2021-08-26 DIAGNOSIS — Z98.890: ICD-10-CM

## 2021-08-26 DIAGNOSIS — Z85.3: ICD-10-CM

## 2021-08-26 DIAGNOSIS — Z08: Primary | ICD-10-CM

## 2021-08-26 DIAGNOSIS — Z88.8: ICD-10-CM

## 2021-08-26 NOTE — P.PN
Subjective


Progress Note Date: 08/26/21


Principal diagnosis: 





right breast stage 1A invasive cancer dx. 12-13-19


right bresat stage IA cancer, left breast mammoplasty


Right breast stage IA invasive ductal carcinoma, lumpectomy done January 2020, 

patient contralateral symmetry procedure of the left breast performed in [place]

cember 2020





Stage IA right breast cancer


     Jyoti is a 64-year-old white female status post right breast lumpectomy 

and sentinel node biopsy done 01/15/2020.  She also had a left breast biopsy wh

ich revealed focal atypical ductal hyperplasia. She finished her radiation 

treatment on March 19th.  She also saw a medical oncologist and is now on 

Anestrazole. She is planning of some hip and knee discomfort.  She is going to 

discuss this with medical oncology.  She is not complaining of any hot flashes. 


She had a bilateral mammogram performed on 818-21 this was benign BIRADS 2. 





Note from DR. Mendez of 5-19-21 revealed patient was noted to be doing well 

with no evidence of recurrent cancer.


 Of importance is the fact of 2020 she underwent a CAT scan of the chest which 

revealed incomplete resolution of posterior basilar consolidation and/or 

atelectasis.  The anterior upper lung consolidation.  Could not exclude 

pneumonia or infectious etiology but suspect it may be related to her breast 

radiation treatment and correlation clinically was recommended.





She had a follow up  CT scan on November 24th, 2020 which revealed improved 

pleural thickening right upper lobe and interstitial changes from radiation 

therapy to the right breast were noted.  This is also being followed by 

radiation oncology.





The patient is not complaining of any lumps masses or nodules of concern in 

either breast.  She is not complaining of any nipple discharge or skin changes.


Family history:


Father: Esophageal cancer





Hormonal history:


Menarche: 13


G2 Ab2, no children, tubal ligation at 30


Menopause: 43


Birth control pills: 15 years


Hormones: None





Surgical history:


1.  Tubal ligation


2.  Bunionectomy


3.  Right breast lumpectomy and sentinel node biopsy, left breast biopsy


4.  Left breast reduction mammoplasty





Medical history:


1.  Varicose veins





Social history:


Smoke: Negative


Alcohol: Wine daily


Drugs: Negative





Review of systems:


Constitutional: Negative


HEENT: Negative


Ears: Tinnitus


Cardiovascular: Hypertension


Respiratory: Negative


GI: Negative


: Status post tubal ligation


Musculoskeletal: Arthritis


Neurologic: Negative


Psychiatric: Negative




















Objective





- Vital Signs


Vital signs: 


                                   Vital Signs











Temp  98.4 F   08/26/21 09:44


 


Pulse  68   08/26/21 09:44


 


Resp  16   08/26/21 09:44


 


BP  145/84   08/26/21 09:44


 


Pulse Ox  97   08/26/21 09:44








                                 Intake & Output











 08/25/21 08/26/21 08/26/21





 18:59 06:59 18:59


 


Weight   85.275 kg














- Constitutional


General appearance: Present: average body habitus





- EENT


Eyes: Present: EOMI


ENT: Present: hearing grossly normal





- Neck


Neck: Present: normal ROM





- Respiratory


Respiratory: bilateral: CTA





- Cardiovascular


Rhythm: regular


Heart sounds: normal: S1, S2





- Gastrointestinal


General gastrointestinal: Present: soft





- Integumentary


Integumentary: Present: normal turgor





- Musculoskeletal


Musculoskeletal: Present: gait normal





- Psychiatric


Psychiatric: Present: A&O x's 3, appropriate affect, intact judgment & insight





- Additional findings


Additional findings: 





Breast exam:


BRA: 40C


inspection: Postoperative changes bilateral breast


Palpation:


 Right breast: Postop changes, no dominant masses or nodules of concern, 

fibrocystic changes


Right axilla: No adenopathy of concern


Left breast: Postop changes, no dominant masses or nodules of concern, 

fibrocystic changes


Left axilla: No adenopathy of concern








Assessment and Plan


Assessment: 





Impression:


1.  Patient status post right breast lumpectomy/radiation therapy/sentinel node 

biopsy for stage IA invasive ductal carcinoma, no evidence of recurrent disease


2.  Patient status post left breast reduction mammoplasty


3.  Recent bilateral mammogram performed 58000 benign BIRADS 2


4.  Patient presently on anastrozole doing well, some joint discomfort and 

intermittent hips and knees





Plan:


1.  Repeat examination in 6 months here


2.  Continue to follow with medical and radiation oncology


3.  Patient has complained of some joint discomfort intermittently and is going 

to discuss this with medical oncology if this could be related to the 

anastrozole





CC: Dr. Beckman

## 2022-01-11 ENCOUNTER — HOSPITAL ENCOUNTER (OUTPATIENT)
Dept: HOSPITAL 47 - OR | Age: 67
Discharge: HOME | End: 2022-01-11
Attending: ORTHOPAEDIC SURGERY
Payer: MEDICARE

## 2022-01-11 VITALS — SYSTOLIC BLOOD PRESSURE: 155 MMHG | DIASTOLIC BLOOD PRESSURE: 88 MMHG | HEART RATE: 65 BPM

## 2022-01-11 VITALS — RESPIRATION RATE: 16 BRPM

## 2022-01-11 VITALS — BODY MASS INDEX: 28.8 KG/M2

## 2022-01-11 VITALS — TEMPERATURE: 98.9 F

## 2022-01-11 DIAGNOSIS — I10: ICD-10-CM

## 2022-01-11 DIAGNOSIS — K21.9: ICD-10-CM

## 2022-01-11 DIAGNOSIS — Z88.8: ICD-10-CM

## 2022-01-11 DIAGNOSIS — Z79.890: ICD-10-CM

## 2022-01-11 DIAGNOSIS — Z79.891: ICD-10-CM

## 2022-01-11 DIAGNOSIS — Z91.048: ICD-10-CM

## 2022-01-11 DIAGNOSIS — Z98.890: ICD-10-CM

## 2022-01-11 DIAGNOSIS — E78.5: ICD-10-CM

## 2022-01-11 DIAGNOSIS — M23.232: Primary | ICD-10-CM

## 2022-01-11 DIAGNOSIS — Z79.899: ICD-10-CM

## 2022-01-11 DIAGNOSIS — M22.42: ICD-10-CM

## 2022-01-11 DIAGNOSIS — Z87.891: ICD-10-CM

## 2022-01-11 DIAGNOSIS — Z97.3: ICD-10-CM

## 2022-01-11 DIAGNOSIS — E03.9: ICD-10-CM

## 2022-01-11 DIAGNOSIS — M65.9: ICD-10-CM

## 2022-01-11 DIAGNOSIS — Z85.3: ICD-10-CM

## 2022-01-11 PROCEDURE — 29881 ARTHRS KNE SRG MNISECTMY M/L: CPT

## 2022-01-11 NOTE — P.OP
Date of Procedure: 01/11/22


Preoperative Diagnosis: 


Torn medial meniscus left knee


Postoperative Diagnosis: 


1.  Torn medial meniscus left knee


2.  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


3.  Synovitis


Procedure(s) Performed: 


1.  Arthroscopy of the left knee with partial medial meniscectomy (40% meniscus 

excised)


2.  Chondroplasty of the medial femoral and patellofemoral compartments


3.  Partial synovectomy of the medial femoral, lateral femoral, patellofemoral 

compartments


Anesthesia: GETA


Surgeon: Tino Baez


Estimated Blood Loss (ml): 5


Pathology: none sent


Condition: stable


Disposition: PACU


Indications for Procedure: 


This is a 66-year-old female presented my office with pain in her left knee 

secondary to torn medial meniscus.  After failing conservative treatment we 

discussed surgical and nonsurgical treatment options with her at length she 

wished to proceed with arthroscopic debridement of her left knee.  Informed 

consent was obtained.


Operative Findings: 


The operative findings are consistent with a tear of the medial meniscus, grade 

2 chondral malacia the medial femoral and patellofemoral compartments and 

synovitis.


Description of Procedure: 


Patient was seen and evaluated in the preoperative area, the operative site was 

marked with a skin marker.  The patient was then brought to the operating room 

and given 2 g of Ancef intravenously.  A general anesthetic was administered by 

the anesthesia department.  Tourniquet was placed on the left upper thigh and 

the left lower extremity was then prepped and draped in usual sterile fashion.





A universal timeout was then performed confirming the patient's name, surgical 

site, ALLERGIES, and consent.  The limb was then exsanguinated and tourniquet 

insufflated to 250 mmHg.  Standard inferior medial and inferior lateral portals 

were established in the knee.  The trochar was  inserted in the inferolateral 

portal.  Examination began at the patellofemoral joint.  There is noted to be 

grade 2 chondral malacia the patellofemoral compartment and a moderate amount of

synovitis.  Next the medial compartment was visualized.  There was a tear of the

posterior horn of the medial meniscus.  There was grade 2 chondral malacia the 

mediofemoral compartment and synovitis.  The notch area was then visualized and 

the ACL was intact.  The Lateral compartment was then visualized and the lateral

meniscus was intact.  There was no evidence of chondromalacia, but a mild amount

of synovitis.





Next, using an arthroscopic shaver and a biter, partial medial meniscectomy was 

performed stable margins.  Approximately 40% of the meniscus was excised.  A 

partial synovectomy is performed the medial femoral, lateral femoral, 

patellofemoral compartments.  Chondroplasty was also performed of the medial 

femoral and patellofemoral compartments of the knee.





Knee was then copiously irrigated, instruments removed, incisions were closed 

with 4-0 nylon.  30 mL of quarter percent plain Marcaine was injected sterilely 

into the surgical area.  A sterile dressing was then applied, and the tourniquet

was released.





Patient was then transferred to recovery room in stable condition.condition.

## 2022-02-05 NOTE — P.PN
Progress Note - Text


Progress Note Date: 12/17/20


 Jyoti is a 65 -year-old female status post reduction of the left breast on 

[address] related to asymmetry for a surgical procedure on the right breast for 

invasive ductal carcinoma.  The patient post procedure is doing well with no 

complaints.





Physical exam:


Lungs: Clear


Heart: Regular rate and rhythm


Incisions: Clean and dry, mild ecchymosis resolving





Impression/Plan:


 Patient doing well status post reduction mammoplasty left breast will plan to 

remove sutures


follow up three weeks Pfizer dose 1 and 2

## 2022-02-24 ENCOUNTER — HOSPITAL ENCOUNTER (OUTPATIENT)
Dept: HOSPITAL 47 - WWCWWP | Age: 67
End: 2022-02-24
Attending: SURGERY
Payer: MEDICARE

## 2022-02-24 VITALS
SYSTOLIC BLOOD PRESSURE: 153 MMHG | DIASTOLIC BLOOD PRESSURE: 85 MMHG | TEMPERATURE: 98.3 F | HEART RATE: 61 BPM | RESPIRATION RATE: 18 BRPM

## 2022-02-24 DIAGNOSIS — Z08: Primary | ICD-10-CM

## 2022-02-24 DIAGNOSIS — Z98.890: ICD-10-CM

## 2022-02-24 DIAGNOSIS — Z87.891: ICD-10-CM

## 2022-02-24 DIAGNOSIS — Z91.041: ICD-10-CM

## 2022-02-24 DIAGNOSIS — Z85.3: ICD-10-CM

## 2022-02-24 DIAGNOSIS — Z88.8: ICD-10-CM

## 2022-02-24 NOTE — P.PN
Subjective


Progress Note Date: 02/24/22


Principal diagnosis: 





stage IA right breast cancer


right breast stage 1A invasive cancer dx. 12-13-19


right bresat stage IA cancer, left breast mammoplasty


Right breast stage IA invasive ductal carcinoma, lumpectomy done January 2020, 

patient contralateral symmetry procedure of the left breast performed in 

December 2020





Stage IA right breast cancer


     Jyoti is a 66-year-old white female status post right breast lumpectomy 

and sentinel node biopsy done 01/15/2020.  She also had a left breast biopsy 

which revealed focal atypical ductal hyperplasia. She finished her radiation 

treatment on March 19th.  She also saw a medical oncologist and is now on 

Anestrazole. She is planning of some hip and knee discomfort.  She is going to 

discuss this with medical oncology.  She is not complaining of any hot flashes. 


She had a bilateral mammogram performed on 818-21 this was benign BIRADS 2. 





Note from DR. Mendez of 5-19-21 revealed patient was noted to be doing well 

with no evidence of recurrent cancer.


 Of importance is the fact of 2020 she underwent a CAT scan of the chest which 

revealed incomplete resolution of posterior basilar consolidation and/or 

atelectasis.  The anterior upper lung consolidation.  Could not exclude 

pneumonia or infectious etiology but suspect it may be related to her breast 

radiation treatment and correlation clinically was recommended.





She had a follow up  CT scan on November 24th, 2020 which revealed improved 

pleural thickening right upper lobe and interstitial changes from radiation 

therapy to the right breast were noted.  This is also being followed by 

radiation oncology.








2-24-22





note 9-7-21 reviewed from DR. Bailey grijalva raphael





Is not complaining of any new lumps masses or nodules in either breast.  She 

does not complain of any nipple discharge or skin changes.





mammogram on 8-18-21 benign








Family history:


Father: Esophageal cancer





Hormonal history:


Menarche: 13


G2 Ab2, no children, tubal ligation at 30


Menopause: 43


Birth control pills: 15 years


Hormones: None





Surgical history:


1.  Tubal ligation


2.  Bunionectomy


3.  Right breast lumpectomy and sentinel node biopsy, left breast biopsy


4.  Left breast reduction mammoplasty


5. left knee arthroscopic surgery





Medical history:


1.  Varicose veins





Social history:


Smoke: Negative


Alcohol: Wine daily


Drugs: Negative





Review of systems:


Constitutional: Negative


HEENT: Negative


Ears: Tinnitus


Cardiovascular: Hypertension


Respiratory: Negative


GI: Negative


: Status post tubal ligation


Musculoskeletal: Arthritis


Neurologic: Negative


Psychiatric: Negative




















Objective





- Vital Signs


Vital signs: 


                                   Vital Signs











Temp  98.3 F   02/24/22 12:16


 


Pulse  61   02/24/22 12:16


 


Resp  18   02/24/22 12:16


 


BP  153/85   02/24/22 12:16


 


Pulse Ox  98   02/24/22 12:16








                                 Intake & Output











 02/23/22 02/24/22 02/24/22





 18:59 06:59 18:59


 


Weight   81.647 kg














- Exam





BMI 27.4





- Constitutional


General appearance: Present: cooperative





- EENT


Eyes: Present: EOMI


ENT: Present: hearing grossly normal





- Neck


Neck: Present: normal ROM





- Respiratory


Respiratory: bilateral: CTA





- Cardiovascular


Rhythm: regular


Heart sounds: normal: S1, S2





- Gastrointestinal


General gastrointestinal: Present: soft





- Integumentary


Integumentary: Present: normal turgor





- Musculoskeletal


Musculoskeletal: Present: gait normal





- Psychiatric


Psychiatric: Present: A&O x's 3, appropriate affect, intact judgment & insight





- Additional findings


Additional findings: 





Breast Exam:


BRA: 40D


inspection: bilteral 2 ptosis, postop changes bilateral breast


Palpation:


Right breast: Multiple positional exam fibrocystic changes, postradiation and 

surgical changes no dominant masses or nodules of concern


Right axilla: No adenopathy of concern


Left breast: Reveals scars from prior reduction mammoplasty, no dominant masses 

or nodules of concern


Left axilla: No adenopathy of concern





Assessment and Plan


Assessment: 





Impression:


Patient stable right breast lumpectomy and radiation therapy stage IA invasive 

ductal carcinoma no evidence of recurrence


Patient for bilateral mammogram in August 2022





Plan:


Bilateral mammogram 2022


Continue Arimidex


Continue follow-up with medical oncology


Follow-up here after bilateral mammogram in August





CC: Dr. Beckman

## 2022-03-14 ENCOUNTER — HOSPITAL ENCOUNTER (OUTPATIENT)
Dept: HOSPITAL 47 - RADBDWWP | Age: 67
Discharge: HOME | End: 2022-03-14
Attending: INTERNAL MEDICINE
Payer: MEDICARE

## 2022-03-14 DIAGNOSIS — Z78.0: ICD-10-CM

## 2022-03-14 DIAGNOSIS — M85.89: Primary | ICD-10-CM

## 2022-03-14 PROCEDURE — 77080 DXA BONE DENSITY AXIAL: CPT

## 2022-03-14 NOTE — BD
EXAMINATION TYPE: Axial Bone Density

 

DATE OF EXAM: 3/14/2022

 

COMPARISON: 2020

 

CLINICAL HISTORY:

 

Height:  67 IN

Weight:  188 LBS

 

FRAX RISK QUESTIONS:

Secondary Osteoporosis:

    3.  Menopause before 45: AGE 42

 

RISK FACTORS 

HISTORY OF: 

Family History of Osteoporosis: YES MOTHER

Active: YES

Diet low in dairy products/other sources of calcium:  YES

Postmenopausal woman: AGE 42

 

MEDICATIONS: 

Thyroid Medications:  YES

Which medication: Levothyroxine

How Lon+ YEARS

Osteoporosis Medications: NOT NOW

Which medication:  Fosamax

How Long: TOOK FOR 2 YEARS

Additional Medications: CALCIUM, VIT D, LEVOTHYROXINE, BLOOD PRESSURE MEDS, VIT B, FISH OIL, VIT C, A
NASTROZOLE, MILK THISTLE,GABAPENTIN  

 

 

Additional History: BREAST CANCER WITH RADIATION

 

 

EXAM MEASUREMENTS: 

Bone mineral densitometry was performed using the [employer] System.

Bone mineral density as measured about the Lumbar spine is:  

----- L1-L4(G/cm2): 1.048

T Score Values are as follows:

----- L2: -1.8

----- L3: -0.9

----- L4: -0.1

----- L1-L4: -1.1

Bone mineral density has: Decreased -3.3% since study of: 2020

 

Bone mineral density about the R hip (g/cm2): 0.852

Bone mineral density about the L hip (g/cm2): 0.822

T Score values are as follows:

-----R Neck: -1.3

-----L Neck: -1.6

-----R Total: -1.2

-----L Total: -1.2

Bone mineral density has: Decreased -2.8% since study of: 2020

 

 

IMPRESSION:

Osteopenia

 

NOTE:  T-SCORE=SD OF THE YOUNG ADULT MEAN.

## 2023-03-23 ENCOUNTER — HOSPITAL ENCOUNTER (OUTPATIENT)
Dept: HOSPITAL 47 - WWCWWP | Age: 68
End: 2023-03-23
Attending: SURGERY
Payer: MEDICARE

## 2023-03-23 VITALS
HEART RATE: 67 BPM | DIASTOLIC BLOOD PRESSURE: 85 MMHG | RESPIRATION RATE: 17 BRPM | SYSTOLIC BLOOD PRESSURE: 143 MMHG | TEMPERATURE: 98.4 F

## 2023-03-23 DIAGNOSIS — Z87.891: ICD-10-CM

## 2023-03-23 DIAGNOSIS — Z51.0: ICD-10-CM

## 2023-03-23 DIAGNOSIS — Z85.3: Primary | ICD-10-CM

## 2023-03-23 DIAGNOSIS — Z80.9: ICD-10-CM

## 2023-03-23 DIAGNOSIS — Z88.8: ICD-10-CM

## 2023-03-23 DIAGNOSIS — Z91.041: ICD-10-CM

## 2023-03-23 NOTE — P.PN
Subjective


Progress Note Date: 03/23/23


Principal diagnosis: 





stage IA right breast invasive ductal cancer [id number]+Pr+Her2-; dx. 12-13-19


Right breast stage IA invasive ductal carcinoma, lumpectomy done January 2020, 

patient contralateral symmetry procedure of the left breast mammoplasty 

performed in December 2020





Stage IA right breast cancer


     Jyoti is a 67-year-old white female status post right breast lumpectomy 

and sentinel node biopsy done 01/15/2020.  She also had a left breast biopsy 

which revealed focal atypical ductal hyperplasia. She finished her radiation 

treatment on March 19th.  She also saw a medical oncologist and is now on 

Anestrazole. 





She is planning of some hip and knee discomfort, she is following with 

orthopedic surgery, she was recommended to have a knee replacement on the left 

side.  She is not complaining of any hot flashes. 


She had a bilateral mammogram performed on 822-22 this was benign BIRADS 2. 





She is not complaining of any new lumps masses or nodules of concern in either 

breast.





Note Medical oncology Dr. Avalos 9-2-22 reviewed





Note from DR. Mendez of 3-15-23 revealed patient was noted to be doing well 

with no evidence of recurrent cancer.





 Of importance is the fact of 2020 she underwent a CAT scan of the chest which 

revealed incomplete resolution of posterior basilar consolidation and/or 

atelectasis.  The anterior upper lung consolidation.  Could not exclude 

pneumonia or infectious etiology but suspect it may be related to her breast 

radiation treatment and correlation clinically was recommended.





She had a follow up  CT scan on November 24th, 2020 which revealed improved 

pleural thickening right upper lobe and interstitial changes from radiation 

therapy to the right breast were noted.  This is also being followed by 

radiation oncology. 





She is not complaining of any new lumps masses or nodules of concern in either 

breast.














Family history:


Father: Esophageal cancer





Hormonal history:


Menarche: 13


G2 Ab2, no children, tubal ligation at 30


Menopause: 43


Birth control pills: 15 years


Hormones: None





Surgical history:


1.  Tubal ligation


2.  Bunionectomy


3.  Right breast lumpectomy and sentinel node biopsy, left breast biopsy


4.  Left breast reduction mammoplasty


5. left knee arthroscopic surgery








Medical history:


1.  Varicose veins


2. back pain 





Social history:


Smoke: Negative


Alcohol: Wine daily


Drugs: Negative





Review of systems:


Constitutional: Negative


HEENT: Negative


Ears: Tinnitus


Cardiovascular: Hypertension


Respiratory: Negative


GI: Negative


: Status post tubal ligation


Musculoskeletal: Arthritis; back pain


Neurologic: Negative


Psychiatric: Negative

















Objective





- Vital Signs


Vital signs: 


                                   Vital Signs











Temp  98.4 F   03/23/23 09:36


 


Pulse  67   03/23/23 09:36


 


Resp  17   03/23/23 09:36


 


BP  143/85   03/23/23 09:36


 


Pulse Ox  97   03/23/23 09:36


 


FiO2      








                                 Intake & Output











 03/22/23 03/23/23 03/23/23





 18:59 06:59 18:59


 


Weight   86.183 kg














- Constitutional


General appearance: Present: cooperative





- EENT


Eyes: Present: EOMI


ENT: Present: hearing grossly normal





- Neck


Neck: Present: normal ROM





- Respiratory


Respiratory: bilateral: CTA





- Cardiovascular


Rhythm: regular


Heart sounds: normal: S1, S2





- Gastrointestinal


General gastrointestinal: Present: soft





- Integumentary


Integumentary: Present: normal turgor





- Musculoskeletal


Musculoskeletal: Present: gait normal





- Psychiatric


Psychiatric: Present: A&O x's 3, appropriate affect, intact judgment & insight





- Additional findings


Additional findings: 


Breast Exam:


BRA: 40C


Inspection: bilateral post op changes, grade 2 ptosis bilateral


Patient:


Right breast: Postop changes, no dominant masses or nodules of concern on multi-

positional exam


Right axilla: No adenopathy of concern


Left breast: Post surgical changes, no dominant masses or nodules of concern of 

multiple positional exam


Left axilla: No adenopathy of concern











Assessment and Plan


Assessment: 





Impression:


Patient status post right breast invasive ductal carcinoma stage IA 2019 no 

evidence of any recurrence


Patient completed radiation therapy and is presently on Anestrazole which she is

tolerating





Plan:


Bilateral mammogram August 2023 with appointment at that time


Continue follow-up with medical oncology


Continue follow-up with radiation oncology


Patient to follow up sooner any questions or concerns





Cc: Dr. Beckman

## 2023-08-23 ENCOUNTER — HOSPITAL ENCOUNTER (OUTPATIENT)
Dept: HOSPITAL 47 - RADMAMWWP | Age: 68
Discharge: HOME | End: 2023-08-23
Attending: SURGERY
Payer: MEDICARE

## 2023-08-23 DIAGNOSIS — Z78.0: ICD-10-CM

## 2023-08-23 DIAGNOSIS — Z85.3: ICD-10-CM

## 2023-08-23 DIAGNOSIS — Z12.31: Primary | ICD-10-CM

## 2023-08-23 PROCEDURE — 77067 SCR MAMMO BI INCL CAD: CPT

## 2023-08-23 PROCEDURE — 77063 BREAST TOMOSYNTHESIS BI: CPT

## 2023-08-24 NOTE — MM
Patient was updated and will follow instructions. Advised to call with questions or concerns.    Reason for Exam: Screening  (asymptomatic). 

Last screening mammogram was performed 12 month(s) ago.





Patient History: 

Menarche at age 12. Patient has no children. Postmenopausal. Breast cancer, right, age 64. Previous

chest radiation therapy at age 64. Hormonal Contraceptives for 11 years from age 19 until age 30.

Excisional Biopsy on the Right side. 12/2020, Reduction on the Left side. 01/15/2020, Lumpectomy on

the Right side. 1/15/2020, Malignant Core Biopsy on the right side. 1/15/2020, Malignant Core Biopsy

on the left side. 1/30/2001, Benign Excisional Biopsy on the left side. 2020, Radiation Therapy on

the right side. 





Prior Study Comparison: 

8/11/2020 Bilateral Diagnostic Mammogram, [facility]. 8/18/2021 Bilateral Diagnostic Mammogram, [facility].

8/22/2022 Bilateral MG 3D diag mammo w/cad ENDY, [facility]. 





Tissue Density: 

The breast tissue is heterogeneously dense. This may lower the sensitivity of mammography.





Findings: 

Analyzed By CAD. 

There is a new nodule upper outer right breast between 8 and 9 cm from the nipple. Additional views

are recommended. Traumatic oil cysts are seen bilaterally. History of prior right-sided lumpectomy. 





Overall Assessment: Incomplete: need additional imaging evaluation, BI-RAD 0





Management: 

Diagnostic Mammogram of the right breast.

.



Patient should continue monthly self-breast exams.  A clinical breast exam by your physician is

recommended on an annual basis.

This exam should not preclude additional follow-up of suspicious palpable abnormalities.



Note on Linda scores and lifetime risk:

1. A Linda score greater than 3% is considered moderate risk. If this is the case, consider

specialist referral to assess eligibility for a risk reducing agent.

2. If overall lifetime risk for the development of breast cancer is 20% or higher, the patient may

qualify for future screening with alternating mammogram and breast MRI.



Electronically signed and approved by: Leopold M. Fregoli, M.D. Radiologis

## 2023-08-25 ENCOUNTER — HOSPITAL ENCOUNTER (OUTPATIENT)
Dept: HOSPITAL 47 - RADMAMWWP | Age: 68
Discharge: HOME | End: 2023-08-25
Attending: SURGERY
Payer: MEDICARE

## 2023-08-25 DIAGNOSIS — R92.8: Primary | ICD-10-CM

## 2023-08-25 DIAGNOSIS — Z78.0: ICD-10-CM

## 2023-08-25 DIAGNOSIS — Z85.3: ICD-10-CM

## 2023-08-25 DIAGNOSIS — Z92.3: ICD-10-CM

## 2023-08-25 PROCEDURE — 77065 DX MAMMO INCL CAD UNI: CPT

## 2023-08-25 PROCEDURE — 77061 BREAST TOMOSYNTHESIS UNI: CPT

## 2023-08-25 NOTE — USB
Patient History: 

Menarche at age 12. Patient has no children. Postmenopausal. Breast cancer, right, age 64. Previous

chest radiation therapy at age 64. Hormonal Contraceptives for 11 years from age 19 until age 30.

Excisional Biopsy on the Right side. 12/2020, Reduction on the Left side. 01/15/2020, Lumpectomy on

the Right side. 1/15/2020, Malignant Core Biopsy on the right side. 1/15/2020, Malignant Core Biopsy

on the left side. 1/30/2001, Benign Excisional Biopsy on the left side. 2020, Radiation Therapy on

the right side. 

Technique: 

Method: Targeted.  





Prior Study Comparison: 

8/18/2021 Bilateral Diagnostic Mammogram, [facility]. 8/22/2022 Bilateral MG 3D diag mammo w/cad ENDY, [facility].

8/23/2023 Bilateral MG 3D screening mammo w/cad, [facility]. 





Findings: 

The upper outer quadrant of the right breast, the axilla of the right breast and the retroareolar of

the right breast were scanned.

Imaged: Ultrasound imaging of: All 4 quadrants, the retroareolar region and axilla.



Irregular shaped lesion at 10:00 9 cm from the nipple which is predominantly hypoechoic with

irregular borders.

No abnormally enlarged lymph nodes in the axilla. 





Overall Assessment: Highly suggestive of malignancy, BI-RAD 5





Management: 

Ultrasound Core Biopsy of the right breast.

A clinical breast exam by your physician is recommended on an annual basis and results should be

correlated with mammographic findings.  This exam should not preclude additional follow-up of

suspicious palpable abnormalities.  Results were given to the patient verbally at the time of exam.



Electronically signed and approved by: Low Cheng DO

## 2023-08-25 NOTE — MM
Reason for Exam: Additional evaluation requested from abnormal screening. 

Last screening mammogram was performed less than 1 month ago.





Patient History: 

Menarche at age 12. Patient has no children. Postmenopausal. Breast cancer, right, age 64. Previous

chest radiation therapy at age 64. Hormonal Contraceptives for 11 years from age 19 until age 30.

Excisional Biopsy on the Right side. 12/2020, Reduction on the Left side. 01/15/2020, Lumpectomy on

the Right side. 1/15/2020, Malignant Core Biopsy on the right side. 1/15/2020, Malignant Core Biopsy

on the left side. 1/30/2001, Benign Excisional Biopsy on the left side. 2020, Radiation Therapy on

the right side. 





Prior Study Comparison: 

10/2/2018 Bilateral Screening Mammogram, [facility]. 8/11/2020 Bilateral Diagnostic Mammogram, [facility].

8/18/2021 Bilateral Diagnostic Mammogram, [facility]. 8/22/2022 Bilateral MG 3D diag mammo w/cad ENDY, [facility].

8/23/2023 Bilateral MG 3D screening mammo w/cad, [facility]. 





Tissue Density: 

Right: There are scattered fibroglandular densities.





Findings: 

Analyzed By CAD. 

Right breast lateral aspect posterior depth demonstrates a lesion measuring 10 mm and is 9.3 cm from

the nipple.

Benign-appearing oil cyst with peripheral desiccation and surgical clips present. 





Overall Assessment: Incomplete: need additional imaging evaluation, BI-RAD 0





Management: 

Diagnostic Breast Ultrasound of the right breast.

Results were given to the patient verbally at the time of exam.



Patient should continue monthly self-breast exams.  A clinical breast exam by your physician is

recommended on an annual basis.

This exam should not preclude additional follow-up of suspicious palpable abnormalities.





Note on Linda scores and lifetime risk:

1. A Linda score greater than 3% is considered moderate risk. If this is the case, consider

specialist referral to assess eligibility for a risk reducing agent.

2. If overall lifetime risk for the development of breast cancer is 20% or higher, the patient may

qualify for future screening with alternating mammogram and breast MRI.



Electronically signed and approved by: Low Cheng DO

## 2023-09-06 ENCOUNTER — HOSPITAL ENCOUNTER (OUTPATIENT)
Dept: HOSPITAL 47 - RADUSWWP | Age: 68
End: 2023-09-06
Attending: SURGERY
Payer: MEDICARE

## 2023-09-06 DIAGNOSIS — Z78.0: ICD-10-CM

## 2023-09-06 DIAGNOSIS — C50.911: Primary | ICD-10-CM

## 2023-09-06 PROCEDURE — 19083 BX BREAST 1ST LESION US IMAG: CPT

## 2023-09-06 PROCEDURE — 88342 IMHCHEM/IMCYTCHM 1ST ANTB: CPT

## 2023-09-06 PROCEDURE — 88305 TISSUE EXAM BY PATHOLOGIST: CPT

## 2023-09-06 PROCEDURE — 77065 DX MAMMO INCL CAD UNI: CPT

## 2023-09-13 NOTE — MM
Reason for Exam: Post Procedure Mammogram. 

Last screening mammogram was performed less than 1 month ago.





Patient History: 

Menarche at age 12. Patient has no children. Postmenopausal. Breast cancer, right, age 64. Previous

chest radiation therapy at age 64. Hormonal Contraceptives for 11 years from age 19 until age 30.

Excisional Biopsy on the Right side. 12/2020, Reduction on the Left side. 01/15/2020, Lumpectomy on

the Right side. 1/15/2020, Malignant Core Biopsy on the right side. 1/15/2020, Malignant Core Biopsy

on the left side. 1/30/2001, Benign Excisional Biopsy on the left side. 2020, Radiation Therapy on

the right side. 





Prior Study Comparison: 

10/2/2018 Bilateral Screening Mammogram, [facility]. 10/28/2019 Bilateral Screening Mammogram, [facility].

11/7/2019 Bilateral Diagnostic Mammogram, [facility]. 8/11/2020 Bilateral Diagnostic Mammogram, [facility].

8/18/2021 Bilateral Diagnostic Mammogram, [facility]. 8/22/2022 Bilateral MG 3D diag mammo w/cad ENDY, [facility].

8/23/2023 Bilateral MG 3D screening mammo w/cad, [facility]. 8/25/2023 Right MG 3D work up w/cad RT, [facility]. 





Tissue Density: 

Right: The breast tissue is heterogeneously dense. This may lower the sensitivity of mammography.

Pathology Description: 

Location: 10 o'clock.

Marker Left Behind.

Needle Type: Mammotome      Cores: 6 Skin Nicks: 1 Gauge: 13

The procedure of ultrasound guided core biopsy was explained to the patient.  Benefits,

alternatives, and risks were discussed.  An informed consent was then obtained.  A timeout was

performed.



The patient was placed in supine positioning for  imaging and for the procedure. The overlying skin

was prepped and draped in usual sterile fashion.  Lidocaine was used as anesthetic into the skin and

subcutaneous tissue up to area of concern in the right breast.  A small skin nick was made with

surgical scalpel.



Under ultrasound guidance, a 12-gauge vacuum assisted biopsy gun device was used to obtain 6 core

samples.  A biopsy clip was left in lesion.  Hydromark core top  was placed.



The patient tolerated the procedure well without any immediate complication.  The patient was kept

in the radiology department for short stay after the procedure and then discharged home in stable

condition.



Postprocedure mammogram: The patient was transferred to mammography for physician ordered post

procedure mammogram for clip placement verification.







Impression: Successful ultrasound guided core biopsy of area of concern in the     breast, full

pathology results to follow.



Recommendations:

1. Recommendations are pending pathology results. 





Pathology Results: 

RIGHT BREAST, TEN O'CLOCK, ULTRASOUND GUIDED NEEDLE CORE BIOPSY:  Scar/fibrosis with fat necrosis

and mild chronic inflammation, negative for malignancy.  See note.



Notes



CK AE1/3 immunostain performed and evaluated with an appropriate positive control is negative for

evidence of invasive carcinoma. The results confirm the above diagnosis. 





Overall Assessment: Suspicious, BI-RAD 4





Assessment: MG diagnostic mammo RT wo CAD - Right: Suspicious, BI-RAD 4. 





Management: 

Surgical Consultation of the right breast.

Repeat Procedure

Repeat reason: Histologic or cytologic discordance with imaging.



Findings may be concordant but given imaging appearance and increasing size, treat as discordant

with needle localization and excision.



Electronically signed and approved by: Dipesh Manzo D.O. Radiologis

## 2023-09-14 ENCOUNTER — HOSPITAL ENCOUNTER (OUTPATIENT)
Dept: HOSPITAL 47 - WWCWWP | Age: 68
End: 2023-09-14
Attending: SURGERY
Payer: MEDICARE

## 2023-09-14 VITALS
RESPIRATION RATE: 17 BRPM | SYSTOLIC BLOOD PRESSURE: 149 MMHG | HEART RATE: 66 BPM | DIASTOLIC BLOOD PRESSURE: 82 MMHG | TEMPERATURE: 97.8 F

## 2023-09-14 DIAGNOSIS — Z12.31: Primary | ICD-10-CM

## 2023-09-14 DIAGNOSIS — Z88.8: ICD-10-CM

## 2023-09-14 DIAGNOSIS — Z85.3: ICD-10-CM

## 2023-09-14 DIAGNOSIS — Z51.0: ICD-10-CM

## 2023-09-14 DIAGNOSIS — Z92.3: ICD-10-CM

## 2023-09-14 DIAGNOSIS — Z91.041: ICD-10-CM

## 2023-09-14 DIAGNOSIS — Z87.891: ICD-10-CM

## 2023-09-14 NOTE — P.PN
Subjective


Progress Note Date: 09/14/23


Principal diagnosis: 





stage IA right breast invasive ductal cancer, dx. 2019; core biopsy discordant 

right breast 9-6-23


stage IA right breast invasive ductal cancer [id number]+Pr+Her2-; dx. 12-13-19


Right breast stage IA invasive ductal carcinoma, lumpectomy done January 2020, 

patient contralateral symmetry procedure of the left breast mammoplasty 

performed in December 2020





Stage IA right breast cancer


     Jyoti is a 67-year-old white female status post right breast lumpectomy 

and sentinel node biopsy done 01/15/2020.  She also had a left breast biopsy 

which revealed focal atypical ductal hyperplasia. She finished her radiation 

treatment on March 19th.  She also saw a medical oncologist and is now on 

Anestrazole. 





She is planning of some hip and knee discomfort, she is following with 

orthopedic surgery, she was recommended to have a knee replacement on the left s

julio c.  She is not complaining of any hot flashes. 


She had a bilateral mammogram performed on 822-22 this was benign BIRADS 2. 





She is not complaining of any new lumps masses or nodules of concern in either 

breast.





Note Medical oncology Dr. Avalos 9-2-22 reviewed





Note from DR. Mendez of 3-15-23 revealed patient was noted to be doing well 

with no evidence of recurrent cancer.





 Of importance is the fact of 2020 she underwent a CAT scan of the chest which 

revealed incomplete resolution of posterior basilar consolidation and/or 

atelectasis.  The anterior upper lung consolidation.  Could not exclude 

pneumonia or infectious etiology but suspect it may be related to her breast 

radiation treatment and correlation clinically was recommended.





She had a follow up  CT scan on November 24th, 2020 which revealed improved 

pleural thickening right upper lobe and interstitial changes from radiation 

therapy to the right breast were noted.  This is also being followed by 

radiation oncology. 





She is not complaining of any new lumps masses or nodules of concern in either 

breast.





9-14-23





The patient on 8-23-23 had a bilateral mammogram done, this revealed a new 

nodule in the right breast. Nothing of concern noted in the left breast. This 

led to a right breast diagnostic mammogram and ultrasound on 8-25-23. These led 

to a right breast ultrasound core biopsy on 9-6-23.  The core biopsy was felt to

be discordant from radiolsoy and needle localization and resection recommended. 

This was reviewed with Dr. Candelaria. 





She is continuing on her anastrozole.  She is scheduled at this time for total 

knee replacement on the left side.  He does not feel any new lumps masses or 

nodules of concern in either breast.  She is status post right breast lumpectomy

and sentinel node biopsy done on 1152020.  This was for a stage I invasive 

ductal carcinoma.  She was treated with radiation therapy which he completed on 

03/19/2020.  She is also on anastrozole.  She did not receive any chemotherapy.








Family history:


Father: Esophageal cancer





Hormonal history:


Menarche: 13


G2 Ab2, no children, tubal ligation at 30


Menopause: 43


Birth control pills: 15 years


Hormones: None





Surgical history:


1.  Tubal ligation


2.  Bunionectomy


3.  Right breast lumpectomy and sentinel node biopsy, left breast biopsy


4.  Left breast reduction mammoplasty


5. left knee arthroscopic surgery








Medical history:


1.  Varicose veins


2. back pain 





Social history:


Smoke: Negative


Alcohol: Wine daily


Drugs: Negative





Review of systems:


Constitutional: Negative


HEENT: Negative


Ears: Tinnitus


Cardiovascular: Hypertension


Respiratory: Negative


GI: Negative


: Status post tubal ligation


Musculoskeletal: Arthritis; back pain


Neurologic: Negative


Psychiatric: Negative

















Objective





- Vital Signs


Vital signs: 


                                   Vital Signs











Temp  98.4 F   03/23/23 09:36


 


Pulse  67   03/23/23 09:36


 


Resp  17   03/23/23 09:36


 


BP  143/85   03/23/23 09:36


 


Pulse Ox  97   03/23/23 09:36


 


FiO2      








                                 Intake & Output











 03/22/23 03/23/23 03/23/23





 18:59 06:59 18:59


 


Weight   86.183 kg














- Constitutional


General appearance: Present: cooperative





- EENT


Eyes: Present: EOMI


ENT: Present: hearing grossly normal





- Neck


Neck: Present: normal ROM





- Respiratory


Respiratory: bilateral: CTA





- Cardiovascular


Rhythm: regular


Heart sounds: normal: S1, S2





- Gastrointestinal


General gastrointestinal: Present: soft





- Integumentary


Integumentary: Present: normal turgor





- Musculoskeletal


Musculoskeletal: Present: gait normal





- Psychiatric


Psychiatric: Present: A&O x's 3, appropriate affect, intact judgment & insight





- Additional findings


Additional findings: 


Breast Exam:


BRA: 40C


Inspection: bilateral post op changes, grade 2 ptosis bilateral


Patient:


Right breast: Postop changes, no dominant masses or nodules of concern on multi-

positional exam


Right axilla: No adenopathy of concern


Left breast: Post surgical changes, no dominant masses or nodules of concern of 

multiple positional exam


Left axilla: No adenopathy of concern











Assessment and Plan


Assessment: 





Impression:


Patient status post right breast invasive ductal carcinoma stage IA 2019 no 

evidence of any recurrence


Patient completed radiation therapy and is presently on Anestrazole which she is

tolerating





Plan:


Bilateral mammogram August 2023 with appointment at that time


Continue follow-up with medical oncology


Continue follow-up with radiation oncology


Patient to follow up sooner any questions or concerns





Cc: Dr. Beckman








Objective





- Vital Signs


Vital signs: 


                                 Intake & Output











 09/13/23 09/14/23 09/14/23





 18:59 06:59 18:59


 


Weight   83.007 kg














- Constitutional


General appearance: Present: cooperative





- EENT


Eyes: Present: EOMI


ENT: Present: hearing grossly normal





- Neck


Neck: Present: normal ROM





- Respiratory


Respiratory: bilateral: CTA





- Cardiovascular


Rhythm: regular


Heart sounds: normal: S1, S2





- Integumentary


Integumentary: Present: normal turgor





- Musculoskeletal


Musculoskeletal: Present: gait normal





- Psychiatric


Psychiatric: Present: A&O x's 3, appropriate affect, intact judgment & insight





- Additional findings


Additional findings: 


Breast Exam:


BRA: 40C


Inspection: bilateral post op changes, grade 2 ptosis bilateral


Patient:


Right breast: Postop changes, no dominant masses or nodules of concern on multi-

positional exam, of ecchymosis lateral aspect right breast near recent core 

biopsy


Right axilla: No adenopathy of concern


Left breast: Post surgical changes, no dominant masses or nodules of concern of 

multiple positional exam


Left axilla: No adenopathy of concern








Assessment and Plan


Assessment: 





Impression:


Patient status post right breast lumpectomy/radiation therapy/hormone therapy 

for stage IA invasive ductal carcinoma


Recent bilateral mammogram


Of concern right breast


Core biopsy right breast on 9623 felt to be discordant





Plan:


Right breast needle localization excisional biopsy of discordant lesion, 

possible optical plastic tissue transfer


Patient has been scheduled for a knee replacement which we will recommend be 

deferred until results of the biopsies are obtained


Clearance Dr. Beckman





Cc: Dr. Beckman

## 2023-10-11 ENCOUNTER — HOSPITAL ENCOUNTER (OUTPATIENT)
Dept: HOSPITAL 47 - RADUSWWP | Age: 68
Discharge: HOME | End: 2023-10-11
Attending: INTERNAL MEDICINE
Payer: MEDICARE

## 2023-10-11 DIAGNOSIS — C50.812: Primary | ICD-10-CM

## 2023-10-11 DIAGNOSIS — Z78.0: ICD-10-CM

## 2023-10-11 DIAGNOSIS — Z17.0: ICD-10-CM

## 2023-10-11 PROCEDURE — 88341 IMHCHEM/IMCYTCHM EA ADD ANTB: CPT

## 2023-10-11 PROCEDURE — 88305 TISSUE EXAM BY PATHOLOGIST: CPT

## 2023-10-11 PROCEDURE — 88342 IMHCHEM/IMCYTCHM 1ST ANTB: CPT

## 2023-10-11 PROCEDURE — 77065 DX MAMMO INCL CAD UNI: CPT

## 2023-10-11 PROCEDURE — 19083 BX BREAST 1ST LESION US IMAG: CPT

## 2023-10-17 NOTE — MM
Reason for Exam: Post Procedure Mammogram. 

Last screening mammogram was performed 2 month(s) ago.





Patient History: 

Menarche at age 12. Patient has no children. Postmenopausal. Breast cancer, right, age 64. Previous

chest radiation therapy at age 64. Hormonal Contraceptives for 11 years from age 19 until age 30.

09/19/2023, Benign MG pre op needle loc RT on the right side. 09/06/2023, US biopsy breast VAD RT on

the Right side. Excisional Biopsy on the Right side. 12/2020, Reduction on the Left side.

01/15/2020, Lumpectomy on the Right side. 1/15/2020, Malignant Core Biopsy on the right side.

1/15/2020, Malignant Core Biopsy on the left side. 1/30/2001, Benign Excisional Biopsy on the left

side. 2020, Radiation Therapy on the right side. 





Prior Study Comparison: 

8/25/2023 Right MG 3D work up w/cad RT, [facility]. 9/6/2023 Right MG diagnostic mammo RT wo CAD, [facility].

9/29/2023 Left MG 3D diag mammo w/cad LT, [facility]. 





Tissue Density: 

Left: The breast tissue is heterogeneously dense. This may lower the sensitivity of mammography.

Pathology Description: 

Location: 3 o'clock.

Marker Left Behind.

Needle Type: Celero         Cores: 3 Gauge: 12

The procedure of ultrasound guided core biopsy was explained to the patient.  Benefits,

alternatives, and risks were discussed.  An informed consent was then obtained.



The patient was placed in supine positioning for  imaging and for the procedure. The overlying skin

was prepped and draped in usual sterile fashion.  Lidocaine buffered with bicarbonate was used as

anesthetic into the skin and subcutaneous tissue up to area of concern in the left 3:00 breast.  A

nick was made with surgical scalpel.



Under ultrasound guidance, a 12-gauge vacuum assisted biopsy gun device was used to obtain 3 core

samples.  Following this, a biopsy clip was left in lesion.



The patient tolerated the procedure well without any immediate complication.  The patient was kept

in the radiology department for short stay after the procedure and then discharged home in stable

condition.



Postprocedure mammogram: The patient was transferred to mammography for physician ordered post

procedure mammogram for clip placement verification.







Impression: Successful, uncomplicated ultrasound guided core biopsy of area of concern in the left

3:00 breast, full pathology results to follow. 





Pathology Results: 

Result: Malignant, Invasive ductal carcinoma.

LEFT BREAST, THREE O'CLOCK POSITION, ULTRASOUND GUIDED CORE BIOPSY:  Invasive ductal carcinoma with

focal micropapillary features, grade 2.  See Surgical Pathology Cancer Case Summary and Comment. 





Overall Assessment: Malignant





Assessment: MG diagnostic mammo LT wo CAD. - Left: Known biopsy proven malignancy, BI-RAD 6. 





Management: 

Surgical Consultation of the left breast.

Electronically signed and approved by: Leopold M. Fregoli, M.D. Radiologis
DISPLAY PLAN FREE TEXT

## 2023-10-20 ENCOUNTER — HOSPITAL ENCOUNTER (OUTPATIENT)
Dept: HOSPITAL 47 - WWCWWP | Age: 68
End: 2023-10-20
Attending: SURGERY
Payer: MEDICARE

## 2023-10-20 VITALS
TEMPERATURE: 97.8 F | HEART RATE: 63 BPM | SYSTOLIC BLOOD PRESSURE: 143 MMHG | DIASTOLIC BLOOD PRESSURE: 72 MMHG | RESPIRATION RATE: 17 BRPM

## 2023-10-20 DIAGNOSIS — C50.911: Primary | ICD-10-CM

## 2023-10-20 DIAGNOSIS — Z79.811: ICD-10-CM

## 2023-10-20 DIAGNOSIS — Z88.8: ICD-10-CM

## 2023-10-20 DIAGNOSIS — Z92.3: ICD-10-CM

## 2023-10-20 DIAGNOSIS — Z87.891: ICD-10-CM

## 2023-10-20 DIAGNOSIS — N60.92: ICD-10-CM

## 2023-10-20 DIAGNOSIS — Z17.0: ICD-10-CM

## 2023-10-20 NOTE — P.PN
Subjective


Progress Note Date: 10/20/23


Principal diagnosis: 





new diagnosis left breast IDC


stage IA right breast invasive ductal cancer [id number]+Pr+Her2-; dx. 12-13-19


Right breast stage IA invasive ductal carcinoma, lumpectomy done January 2020, 

patient contralateral symmetry procedure of the left breast mammoplasty 

performed in December 2020





Stage IA right breast cancer


     Jyoti is a 67-year-old white female status post right breast lumpectomy 

and sentinel node biopsy done 01/15/2020.  She also had a left breast biopsy 

which revealed focal atypical ductal hyperplasia. She finished her radiation 

treatment on March 19th.  She also saw a medical oncologist and is now on 

Anestrazole. 





She is planning of some hip and knee discomfort, she is following with 

orthopedic surgery, she was recommended to have a knee replacement on the left 

side.  She is not complaining of any hot flashes. 


She had a bilateral mammogram performed on 822-22 this was benign BIRADS 2. 





She is not complaining of any new lumps masses or nodules of concern in either 

breast.





Note Medical oncology Dr. Avalos 9-2-22 reviewed





Note from DR. Mendez of 3-15-23 revealed patient was noted to be doing well 

with no evidence of recurrent cancer.





 Of importance is the fact of 2020 she underwent a CAT scan of the chest which 

revealed incomplete resolution of posterior basilar consolidation and/or 

atelectasis.  The anterior upper lung consolidation.  Could not exclude 

pneumonia or infectious etiology but suspect it may be related to her breast 

radiation treatment and correlation clinically was recommended.





She had a follow up  CT scan on November 24th, 2020 which revealed improved 

pleural thickening right upper lobe and interstitial changes from radiation 

therapy to the right breast were noted.  This is also being followed by 

radiation oncology. 





She is not complaining of any new lumps masses or nodules of concern in either 

breast.





9-14-23





The patient on 8-23-23 had a bilateral mammogram done, this revealed a new 

nodule in the right breast. Nothing of concern noted in the left breast. This 

led to a right breast diagnostic mammogram and ultrasound on 8-25-23. These led 

to a right breast ultrasound core biopsy on 9-6-23.  The core biopsy was felt to

be discordant from radiolsoy and needle localization and resection recommended. 

This was reviewed with Dr. Candelaria. Open resection in the operating room benign. 





She is continuing on her anastrozole.  She is scheduled at this time for total 

knee replacement on the left side.  He does not feel any new lumps masses or 

nodules of concern in either breast.  She is status post right breast lumpectomy

and sentinel node biopsy done on [id number].  This was for a stage I invasive 

ductal carcinoma.  She was treated with radiation therapy which he completed on 

03/19/2020.  She is also on anastrozole.  She did not receive any chemotherapy.


10-20-23





The patient was seen by Dr. Avalos who was concerned about an are in the left 

breast. Additional x-ray and ultrasound were done, biopsy + IDC.  He had a 

diagnostic [place] on [address] this was felt to be BIRADS 0 and was 

followed by an ultrasound on the same day.  On the ultrasound a 1.5 cm area was 

identified and it was unable to determine if this was focal scar tissue were 

suspicious lesion.  Therefore core biopsy was obtained.  The core biopsy of this

site was done on [id number].  This was G2 ER+NV+ HER-2/karishma negative.  She 

tolerated the biopsy without difficulty.








Family history:


Father: Esophageal cancer





Hormonal history:


Menarche: 13


G2 Ab2, no children, tubal ligation at 30


Menopause: 43


Birth control pills: 15 years


Hormones: None





Surgical history:


1.  Tubal ligation


2.  Bunionectomy


3.  Right breast lumpectomy and sentinel node biopsy, left breast biopsy


4.  Left breast reduction mammoplasty


5. left knee arthroscopic surgery








Medical history:


1.  Varicose veins


2. back pain 





Social history:


Smoke: Negative


Alcohol: Wine daily


Drugs: Negative





Review of systems:


Constitutional: Negative


HEENT: Negative


Ears: Tinnitus


Cardiovascular: Hypertension


Respiratory: Negative


GI: Negative


: Status post tubal ligation


Musculoskeletal: Arthritis; back pain


Neurologic: Negative


Psychiatric: Negative























Objective





- Constitutional


General appearance: Present: cooperative





- EENT


Eyes: Present: EOMI


ENT: Present: hearing grossly normal





- Neck


Neck: Present: normal ROM





- Respiratory


Respiratory: bilateral: CTA





- Cardiovascular


Rhythm: regular


Heart sounds: normal: S1, S2





- Gastrointestinal


General gastrointestinal: Present: soft





- Integumentary


Integumentary: Present: normal turgor





- Musculoskeletal


Musculoskeletal: Present: gait normal





- Psychiatric


Psychiatric: Present: A&O x's 3, appropriate affect, intact judgment & insight





- Additional findings


Additional findings: 


Breast Exam:


BRA: 40C


Inspection: bilateral post op changes, grade 2 ptosis bilateral


Patient:


Right breast: Postop changes, no dominant masses or nodules of concern on multi-

positional exam, well healed scar recent biopsy


Right axilla: No adenopathy of concern


Left breast: Post surgical changes, some increased nodularity at the 12 o'clock 

position no other dominant masses or nodules of concern


Left axilla: No adenopathy of concern








Assessment and Plan


Assessment: 


Impression:


Patient status post right breast lumpectomy/radiation therapy/hormone therapy 

for stage IA invasive ductal carcinoma


Recent bilateral mammogram


Of concern right breast


Core biopsy right breast on 9623 felt to be discordant recent open biopsy 

benign


left breast core biopsy + IDC





Plan:





Patient has been scheduled for a knee replacement which we will recommend be 

deferred until results of the biopsies are obtained


Case to be presented at tumor board


Left breast needle localization lumpectomy, possible optical plastic tissue 

transfer, left sentinel node injection, left sentinel node biopsy


Bilateral breast MRI


Clearance Dr. Beckman





Cc: Dr. Beckman

## 2023-12-01 ENCOUNTER — HOSPITAL ENCOUNTER (OUTPATIENT)
Dept: HOSPITAL 47 - WWCWWP | Age: 68
End: 2023-12-01
Attending: SURGERY
Payer: MEDICARE

## 2023-12-01 VITALS
TEMPERATURE: 98.7 F | RESPIRATION RATE: 17 BRPM | SYSTOLIC BLOOD PRESSURE: 148 MMHG | HEART RATE: 77 BPM | DIASTOLIC BLOOD PRESSURE: 86 MMHG

## 2023-12-01 DIAGNOSIS — Z88.1: ICD-10-CM

## 2023-12-01 DIAGNOSIS — Z87.891: ICD-10-CM

## 2023-12-01 DIAGNOSIS — C50.912: ICD-10-CM

## 2023-12-01 DIAGNOSIS — Z79.811: ICD-10-CM

## 2023-12-01 DIAGNOSIS — Z12.31: Primary | ICD-10-CM

## 2023-12-01 DIAGNOSIS — C50.911: ICD-10-CM

## 2023-12-01 DIAGNOSIS — Z85.3: ICD-10-CM

## 2023-12-01 DIAGNOSIS — N60.92: ICD-10-CM

## 2023-12-01 DIAGNOSIS — Z17.0: ICD-10-CM

## 2023-12-01 DIAGNOSIS — Z92.3: ICD-10-CM

## 2023-12-01 DIAGNOSIS — Z88.8: ICD-10-CM

## 2023-12-01 NOTE — P.PN
Subjective


Progress Note Date: 12/01/23


Principal diagnosis: 





bilateral breast cancer:


right 12-20-19 stage I


left  invasive ductal cancer 10-; stage I








Subjective


Progress Note Date: 12-1-23


Principal diagnosis: 





new diagnosis left breast IDC


stage IA right breast invasive ductal cancer [id number]+Pr+Her2-; dx. 12-13-19


Right breast stage IA invasive ductal carcinoma, lumpectomy done January 2020, 

patient contralateral symmetry procedure of the left breast mammoplasty 

performed in December 2020


10-20-23


Stage IA right breast cancer


     Jyoti is a 67-year-old white female status post right breast lumpectomy 

and sentinel node biopsy done 01/15/2020.  She also had a left breast biopsy 

which revealed focal atypical ductal hyperplasia. She finished her radiation 

treatment on March 19th.  She also saw a medical oncologist and is now on 

Anestrazole. 





She is planning of some hip and knee discomfort, she is following with 

orthopedic surgery, she was recommended to have a knee replacement on the left 

side.  She is not complaining of any hot flashes. 


She had a bilateral mammogram performed on 822-22 this was benign BIRADS 2. 





She is not complaining of any new lumps masses or nodules of concern in either 

breast.





Note Medical oncology Dr. Avalos 9-2-22 reviewed





Note from DR. Mendez of 3-15-23 revealed patient was noted to be doing well 

with no evidence of recurrent cancer.





 Of importance is the fact of 2020 she underwent a CAT scan of the chest which 

revealed incomplete resolution of posterior basilar consolidation and/or 

atelectasis.  The anterior upper lung consolidation.  Could not exclude 

pneumonia or infectious etiology but suspect it may be related to her breast 

radiation treatment and correlation clinically was recommended.





She had a follow up  CT scan on November 24th, 2020 which revealed improved 

pleural thickening right upper lobe and interstitial changes from radiation 

therapy to the right breast were noted.  This is also being followed by 

radiation oncology. 





She is not complaining of any new lumps masses or nodules of concern in either 

breast.





9-14-23





The patient on 8-23-23 had a bilateral mammogram done, this revealed a new 

nodule in the right breast. Nothing of concern noted in the left breast. This 

led to a right breast diagnostic mammogram and ultrasound on 8-25-23. These led 

to a right breast ultrasound core biopsy on 9-6-23.  The core biopsy was felt to

be discordant from radiolsoy and needle localization and resection recommended. 

This was reviewed with Dr. Candelaria. Open resection in the operating room benign. 





She is continuing on her anastrozole.  She is scheduled at this time for total 

knee replacement on the left side.  He does not feel any new lumps masses or 

nodules of concern in either breast.  She is status post right breast lumpectomy

and sentinel node biopsy done on [id number].  This was for a stage I invasive 

ductal carcinoma.  She was treated with radiation therapy which he completed on 

03/19/2020.  She is also on anastrozole.  She did not receive any chemotherapy.


10-20-23





The patient was seen by Dr. Avalos who was concerned about an are in the left 

breast. Additional x-ray and ultrasound were done, biopsy + IDC.  She had a 

diagnostic mammogram [place] on 9[address]23 this was felt to be BIRADS 0 and was 

followed by an ultrasound on the same day.  On the ultrasound a 1.5 cm area was 

identified and it was unable to determine if this was focal scar tissue were 

suspicious lesion.  Therefore core biopsy was obtained.  The core biopsy of this

site was done on [id number].  This was G2 ER+AZ+ HER-2/karishma negative.  She 

tolerated the biopsy without difficulty.


MRI performed on 11223: Enhancement in the right breast was identified which 

was felt to be related to fat necrosis or seromas she has had recent right 

breast surgery


No lesions of concern noted in the left breast


genetic testing (-)


Patient was felt to be medically optimized for surgery by Dr. Beckman





Family history:


Father: Esophageal cancer





Hormonal history:


Menarche: 13


G2 Ab2, no children, tubal ligation at 30


Menopause: 43


Birth control pills: 15 years


Hormones: None





Surgical history:


1.  Tubal ligation


2.  Bunionectomy


3.  Right breast lumpectomy and sentinel node biopsy, left breast biopsy


4.  Left breast reduction mammoplasty


5. left knee arthroscopic surgery








Medical history:


1.  Varicose veins


2. back pain 





Social history:


Smoke: Negative


Alcohol: Wine daily


Drugs: Negative





Review of systems:


Constitutional: Negative


HEENT: Negative


Ears: Tinnitus


Cardiovascular: Hypertension


Respiratory: Negative


GI: Negative


: Status post tubal ligation


Musculoskeletal: Arthritis; back pain


Neurologic: Negative


Psychiatric: Negative





























Objective





- Constitutional


General appearance: Present: cooperative





- EENT


Eyes: Present: EOMI


ENT: Present: hearing grossly normal





- Neck


Neck: Present: normal ROM





- Respiratory


Respiratory: bilateral: CTA





- Cardiovascular


Rhythm: regular


Heart sounds: normal: S1, S2





- Gastrointestinal


General gastrointestinal: Present: soft





- Integumentary


Integumentary: Present: normal turgor





- Musculoskeletal


Musculoskeletal: Present: gait normal





- Psychiatric


Psychiatric: Present: A&O x's 3, appropriate affect, intact judgment & insight





- Additional findings


Additional findings: 


Breast Exam:


BRA: 40C


Inspection: bilateral post op changes, grade 2 ptosis bilateral


Patient:


Right breast: Postop changes, no dominant masses or nodules of concern on multi-

positional exam, well healed scar recent biopsy


Right axilla: No adenopathy of concern


Left breast: Post surgical changes, some increased nodularity at the 12 o'clock 

position no other dominant masses or nodules of concern


Left axilla: No adenopathy of concern














Assessment and Plan


Assessment: 


Impression:


Patient status post right breast lumpectomy/radiation therapy/hormone therapy 

for stage IA invasive ductal carcinoma


Recent bilateral mammogram


Core biopsy right breast on 9623 felt to be discordant recent open biopsy 

benign


left breast core biopsy invasive ductal carcinoma





Plan:





Patient has been scheduled for a knee replacement which we will recommend be 

deferred until treatment for the breast cancer


Case  presented at tumor board 11-8-23


Left breast needle localization lumpectomy, possible onco plastic tissue 

transfer, left sentinel node injection, left sentinel node biopsy, possible left

axillary dissection


Bilateral breast MRI reviewed wtih the patient 11-2-23


Clearance by Dr. Beckman











Cc: Dr. Beckman

## 2023-12-05 ENCOUNTER — HOSPITAL ENCOUNTER (OUTPATIENT)
Dept: HOSPITAL 47 - OR | Age: 68
Discharge: HOME | End: 2023-12-05
Attending: SURGERY
Payer: MEDICARE

## 2023-12-05 VITALS — SYSTOLIC BLOOD PRESSURE: 125 MMHG | HEART RATE: 60 BPM | DIASTOLIC BLOOD PRESSURE: 68 MMHG | RESPIRATION RATE: 16 BRPM

## 2023-12-05 VITALS — TEMPERATURE: 98.5 F

## 2023-12-05 DIAGNOSIS — Z79.890: ICD-10-CM

## 2023-12-05 DIAGNOSIS — K21.9: ICD-10-CM

## 2023-12-05 DIAGNOSIS — N60.12: ICD-10-CM

## 2023-12-05 DIAGNOSIS — I83.90: ICD-10-CM

## 2023-12-05 DIAGNOSIS — C50.412: Primary | ICD-10-CM

## 2023-12-05 DIAGNOSIS — Z91.041: ICD-10-CM

## 2023-12-05 DIAGNOSIS — Z88.8: ICD-10-CM

## 2023-12-05 DIAGNOSIS — E78.5: ICD-10-CM

## 2023-12-05 DIAGNOSIS — Z85.3: ICD-10-CM

## 2023-12-05 DIAGNOSIS — E03.9: ICD-10-CM

## 2023-12-05 DIAGNOSIS — F10.90: ICD-10-CM

## 2023-12-05 DIAGNOSIS — Z98.51: ICD-10-CM

## 2023-12-05 DIAGNOSIS — I10: ICD-10-CM

## 2023-12-05 DIAGNOSIS — Z79.899: ICD-10-CM

## 2023-12-05 DIAGNOSIS — Z98.890: ICD-10-CM

## 2023-12-05 PROCEDURE — 19281 PERQ DEVICE BREAST 1ST IMAG: CPT

## 2023-12-05 PROCEDURE — 19302 P-MASTECTOMY W/LN REMOVAL: CPT

## 2023-12-05 PROCEDURE — 88341 IMHCHEM/IMCYTCHM EA ADD ANTB: CPT

## 2023-12-05 PROCEDURE — 38792 RA TRACER ID OF SENTINL NODE: CPT

## 2023-12-05 PROCEDURE — 88342 IMHCHEM/IMCYTCHM 1ST ANTB: CPT

## 2023-12-05 PROCEDURE — 88307 TISSUE EXAM BY PATHOLOGIST: CPT

## 2023-12-05 PROCEDURE — 76098 X-RAY EXAM SURGICAL SPECIMEN: CPT

## 2023-12-05 PROCEDURE — 38900 IO MAP OF SENT LYMPH NODE: CPT

## 2023-12-05 RX ADMIN — HYDROMORPHONE HYDROCHLORIDE PRN MG: 1 INJECTION, SOLUTION INTRAMUSCULAR; INTRAVENOUS; SUBCUTANEOUS at 14:23

## 2023-12-05 RX ADMIN — HYDROMORPHONE HYDROCHLORIDE PRN MG: 1 INJECTION, SOLUTION INTRAMUSCULAR; INTRAVENOUS; SUBCUTANEOUS at 14:14

## 2023-12-05 NOTE — P.NAPBC
Ongoing SW/CM Assessment/Plan of Care Note     See SW/CM flowsheets for goals and other objective data.    Patient/Family discharge goal (s):  Goal #1: Psychosocial needs assessed          PT Recommendation:          OT Recommendation:          SLP Recommendation:       Disposition:       Progress note:   11/30 S/p cath w pci. 98% on 2l n/c, weaning as tolerated. Monitoring labs, adjusting med. Provera started for vaginal bleeding. DM educator to see. CM to f/u for needs. RTAE         NAPBC Queries





- NAPBC Queries


Was patient's case review presented at [facility] tumor board? If no, comment.: Yes


Was patient's pathology reviewed at [facility]? If no, comment.: Yes


Was breast conservation surgery offered? If no, comment.: Yes


Was sentinel node biopsy offered? If no, comment.: Yes


Was diagnosis confirmed by percutaneous core biopsy? If no, comment.: Yes


Is patient mastectomy patient?: No


Was a preop referral to reconstructive surgeon offered?: No


Clinical Stage: 





left breast stage 1 invasive ductal cancer

## 2023-12-05 NOTE — P.NAPBC
NAPBC Queries





- NAPBC Queries


Was patient's case review presented at [facility] tumor board? If no, comment.: Yes


Was patient's pathology reviewed at [facility]? If no, comment.: Yes


Was breast conservation surgery offered? If no, comment.: Yes


Was sentinel node biopsy offered? If no, comment.: Yes


Was diagnosis confirmed by percutaneous core biopsy? If no, comment.: Yes


Is patient mastectomy patient?: No


Was a preop referral to reconstructive surgeon offered?: No


Clinical Stage: 





stage 1 left breast invasive ductal cancer

## 2023-12-05 NOTE — P.DS
Providers


Attending physician: 


Kerline Miller





Primary care physician: 


Kaycee Beckman








Plan - Discharge Summary


Discharge Rx Participant: Yes


New Discharge Prescriptions: 


New


   traMADol HCL 50 mg PO Q6H 3 Days #12 tab





No Action


   traMADol HCL [Ultram] 50 mg PO Q4-6H PRN


     PRN Reason: Pain


   Metoprolol Tartrate [Lopressor] 100 mg PO BID


   Levothyroxine Sodium [Synthroid] 75 mcg PO DAILY


   Famotidine 40 mg PO DAILY


   Vitamin E (Dl,Tocopheryl Acet) [Vitamin E] 1,000 unit PO DAILY


   Vitamin B Complex 1 each PO DAILY


   Milk Thistle 150 mg PO DAILY


   Cholecalciferol (Vitamin D3) [Vitamin D3] 2,000 unit PO DAILY


   Anastrozole [Arimidex] 1 mg PO DAILY


   Rosuvastatin [Crestor] 5 mg PO QAM


   Calcium Carbonate [Calcium] 600 mg PO BID


   hydroCHLOROthiazide 25 mg PO QAM


   Ubidecarenone [Co Q-10] 30 mg PO DAILY


   Fexofenadine HCl [Allegra Allergy] 60 mg PO DAILY


Discharge Medication List





Cholecalciferol (Vitamin D3) [Vitamin D3] 2,000 unit PO DAILY 11/14/19 [History]


Famotidine 40 mg PO DAILY 11/14/19 [History]


Levothyroxine Sodium [Synthroid] 75 mcg PO DAILY 11/14/19 [History]


Metoprolol Tartrate [Lopressor] 100 mg PO BID 11/14/19 [History]


Milk Thistle 150 mg PO DAILY 11/14/19 [History]


Vitamin B Complex 1 each PO DAILY 11/14/19 [History]


Vitamin E (Dl,Tocopheryl Acet) [Vitamin E] 1,000 unit PO DAILY 11/14/19 

[History]


traMADol HCL [Ultram] 50 mg PO Q4-6H PRN 11/14/19 [History]


Anastrozole [Arimidex] 1 mg PO DAILY 05/07/20 [History]


Rosuvastatin [Crestor] 5 mg PO QAM 08/21/20 [History]


Calcium Carbonate [Calcium] 600 mg PO BID 11/25/20 [History]


hydroCHLOROthiazide 25 mg PO QAM 12/17/20 [History]


Fexofenadine HCl [Allegra Allergy] 60 mg PO DAILY 03/23/23 [History]


Ubidecarenone [Co Q-10] 30 mg PO DAILY 08/25/23 [History]


traMADol HCL 50 mg PO Q6H 3 Days #12 tab 12/05/23 [Rx]








Follow up Appointment(s)/Referral(s): 


Kerline Miller MD [STAFF PHYSICIAN] - 12/14/23 11:40 am


Activity/Diet/Wound Care/Special Instructions: 


Do not drive for 24 hours from discharge or if taking narcotic pain medicine


May shower after 48 hours


wear bra at all times


Discharge Disposition: HOME SELF-CARE

## 2023-12-05 NOTE — NM
EXAMINATION TYPE: NM sentinel node injection

 

DATE OF EXAM: 12/5/2023

 

COMPARISON: NONE

 

CLINICAL INDICATION: Female, 68 years old with history of LEFT BREAST CA;

 

TECHNIQUE AND FINDINGS: The procedure of sentinel lymph node injection was explained to the patient. 
 The benefits, alternatives, and risks were discussed.  An informed consent was then obtained.  

 

Overlying skin is cleaned with sterile alcohol.  Following this, 469 uCi Tc99m Tilmanocept was inject
ed in the upper outer aspect of the left nipple intradermally.  

 

The patient tolerated the procedure well without any immediate complication.  The patient was kept in
 the radiology department for short stay after the procedure and then taken to surgery for surgical p
rocedure what is presumed intraoperative gamma probe will be used for sentinel lymph node detection. 
 

 

IMPRESSION:  Left breast radiotracer injection for sentinel node localization as above.

## 2023-12-14 ENCOUNTER — HOSPITAL ENCOUNTER (OUTPATIENT)
Dept: HOSPITAL 47 - WWCWWP | Age: 68
End: 2023-12-14
Attending: SURGERY
Payer: MEDICARE

## 2023-12-14 VITALS
SYSTOLIC BLOOD PRESSURE: 143 MMHG | DIASTOLIC BLOOD PRESSURE: 89 MMHG | RESPIRATION RATE: 16 BRPM | HEART RATE: 68 BPM | TEMPERATURE: 98.3 F

## 2023-12-14 DIAGNOSIS — Z88.3: ICD-10-CM

## 2023-12-14 DIAGNOSIS — I49.9: ICD-10-CM

## 2023-12-14 DIAGNOSIS — Z88.1: ICD-10-CM

## 2023-12-14 DIAGNOSIS — Z90.12: Primary | ICD-10-CM

## 2023-12-14 DIAGNOSIS — Z87.891: ICD-10-CM

## 2023-12-14 DIAGNOSIS — Z79.899: ICD-10-CM

## 2023-12-14 NOTE — P.PN
Progress Note - Text


Progress Note Date: 12/14/23





     Usha is a 68 year old female status post left breast lumpectomy and SNB 

on 12-5-23.  Her margins were (-) and node (-). Tumor size 10mm. tolerated the 

surgery well but is still sore at the site.





Examination:


Lungs: Clear


Heart: Regular rate and rhythm


Incision: Clean and dry





Plan:


Follow up with medical oncology


Follow-up radiation oncology


Follow-up here in 4 months





CC: Dr. Beckman

## 2023-12-18 NOTE — MM
Pathology Description: 

Approach: Lateral to Medial Needle Type: 7 cm Kopan

The procedure of needle localization with wire placement and than surgical excision was explained to

the patient.  Benefits, alternatives, and risks were discussed.  An informed consent was then

obtained.



The shortest pathway for procedure was chosen.  Shortest pathway was a lateral approach. The

overlying skin was prepped and draped in usual sterile fashion.  Lidocaine was used as anesthetic

into the skin and subcutaneous tissue up to the level of area of concern. A 7 cm Kopans needle was

used.  It was placed via a lateral approach under mammographic guidance.  Subsequent 90 degrees

mammogram show the needle to be in satisfactory position relative to the targeted area.  At this

point, wire was placed and the needle was withdrawn.  The wire was fixed to patient's skin.  Images

were marked for surgeon.



The patient tolerated the procedure well without any immediate complication.  The patient was kept

in the radiology department for short stay after the procedure and then taken to surgery for

surgical excision.  Targeted mass, clip, and wire are identified in specimen mammogram.  The patient

was kept in hospital for short stay after the procedure and then discharged home in stable

condition.



IMPRESSION: Successful, uncomplicated needle localization with wire placement and surgical excision

of biopsy-proven left breast cancer. Full pathology results to follow. 





Pathology Results: 

Result: Malignant, Invasive ductal carcinoma.

A.  LEFT SENTINEL NODE #1, BIOPSY:  Lymph node negative for metastasis.  CK7 and EMA immunostains

performed on blocks A1 and A2 are confirmatory (controls appropriate).



B.  LEFT BREAST, NEW MEDIAL MARGIN:  Benign breast with fibrocystic changes.



C.  LEFT BREAST, NEW SUPERIOR MARGIN:   Benign fibroadipose tissue.



D.  LEFT BREAST, LUMPECTOMY:  Invasive moderately differentiated ductal carcinoma (Grade 2) with

focal micropapillary features, margins negative.  See Surgical Pathology Cancer Case Summary. 





Overall Assessment: Malignant





Management: 

Surgical Consultation of the left breast.

Electronically signed and approved by: Amberly Milan M.D. Radiologist

## 2024-03-18 ENCOUNTER — HOSPITAL ENCOUNTER (OUTPATIENT)
Dept: HOSPITAL 47 - LABPAT | Age: 69
Discharge: HOME | End: 2024-03-18
Attending: ORTHOPAEDIC SURGERY
Payer: MEDICARE

## 2024-03-18 DIAGNOSIS — I25.2: ICD-10-CM

## 2024-03-18 DIAGNOSIS — Z01.812: Primary | ICD-10-CM

## 2024-03-18 DIAGNOSIS — I51.7: ICD-10-CM

## 2024-03-18 LAB
ALBUMIN SERPL-MCNC: 4.5 G/DL (ref 3.8–4.9)
ALBUMIN/GLOB SERPL: 2.37 RATIO (ref 1.6–3.17)
ALP SERPL-CCNC: 61 U/L (ref 41–126)
ALT SERPL-CCNC: 29 U/L (ref 8–44)
ANION GAP SERPL CALC-SCNC: 10.4 MMOL/L (ref 4–12)
APTT BLD: 23.2 SEC (ref 22–30)
AST SERPL-CCNC: 23 U/L (ref 13–35)
BUN SERPL-SCNC: 18.9 MG/DL (ref 9–27)
BUN/CREAT SERPL: 27 RATIO (ref 12–20)
CALCIUM SPEC-MCNC: 9.7 MG/DL (ref 8.7–10.3)
CHLORIDE SERPL-SCNC: 102 MMOL/L (ref 96–109)
CO2 SERPL-SCNC: 25.6 MMOL/L (ref 21.6–31.8)
ERYTHROCYTE [DISTWIDTH] IN BLOOD BY AUTOMATED COUNT: 4.69 X 10*6/UL (ref 4.1–5.2)
ERYTHROCYTE [DISTWIDTH] IN BLOOD: 12.6 % (ref 11.5–14.5)
GLOBULIN SER CALC-MCNC: 1.9 G/DL (ref 1.6–3.3)
GLUCOSE SERPL-MCNC: 92 MG/DL (ref 70–110)
HCT VFR BLD AUTO: 44.7 % (ref 37.2–46.3)
HGB BLD-MCNC: 14.7 G/DL (ref 12–15)
INR PPP: 0.9 (ref ?–1.2)
MCH RBC QN AUTO: 31.3 PG (ref 27–32)
MCHC RBC AUTO-ENTMCNC: 32.9 G/DL (ref 32–37)
MCV RBC AUTO: 95.3 FL (ref 80–97)
NRBC BLD AUTO-RTO: 0 X 10*3/UL (ref 0–0.01)
PLATELET # BLD AUTO: 272 X 10*3/UL (ref 140–440)
POTASSIUM SERPL-SCNC: 4.1 MMOL/L (ref 3.5–5.5)
PROT SERPL-MCNC: 6.4 G/DL (ref 6.2–8.2)
PT BLD: 10.1 SEC (ref 10–12.5)
SODIUM SERPL-SCNC: 138 MMOL/L (ref 135–145)
WBC # BLD AUTO: 8.01 X 10*3/UL (ref 4.5–10)

## 2024-03-18 PROCEDURE — 85027 COMPLETE CBC AUTOMATED: CPT

## 2024-03-18 PROCEDURE — 87070 CULTURE OTHR SPECIMN AEROBIC: CPT

## 2024-03-18 PROCEDURE — 80053 COMPREHEN METABOLIC PANEL: CPT

## 2024-03-18 PROCEDURE — 36415 COLL VENOUS BLD VENIPUNCTURE: CPT

## 2024-03-18 PROCEDURE — 85610 PROTHROMBIN TIME: CPT

## 2024-03-18 PROCEDURE — 85730 THROMBOPLASTIN TIME PARTIAL: CPT

## 2024-03-20 ENCOUNTER — HOSPITAL ENCOUNTER (OUTPATIENT)
Dept: HOSPITAL 47 - RADMAMWWP | Age: 69
Discharge: HOME | End: 2024-03-20
Attending: INTERNAL MEDICINE
Payer: MEDICARE

## 2024-03-20 DIAGNOSIS — Z78.0: ICD-10-CM

## 2024-03-20 DIAGNOSIS — D24.2: ICD-10-CM

## 2024-03-20 DIAGNOSIS — C50.411: ICD-10-CM

## 2024-03-20 DIAGNOSIS — Z71.3: ICD-10-CM

## 2024-03-20 DIAGNOSIS — M85.89: Primary | ICD-10-CM

## 2024-03-20 DIAGNOSIS — Z85.3: ICD-10-CM

## 2024-03-20 PROCEDURE — 77065 DX MAMMO INCL CAD UNI: CPT

## 2024-03-20 PROCEDURE — 77080 DXA BONE DENSITY AXIAL: CPT

## 2024-03-20 PROCEDURE — 77061 BREAST TOMOSYNTHESIS UNI: CPT

## 2024-03-20 NOTE — BD
EXAMINATION TYPE: Axial Bone Density

 

DATE OF EXAM: 3/20/2024

 

CLINICAL HISTORY: 68 years old Female.  ICD-10 CODE: M85.9 OSTEOPENIA

 

Height:  67

Weight:  184

 

FRAX RISK QUESTIONS:

 

Alcohol (3 or more units per day):  yes

History of Fracture in Adulthood: yes

Secondary Osteoporosis: no

 

RISK FACTORS 

 

HISTORY OF: 

Surgery to Spine/Hip(right/left)/Wrist (right/left): no

 

 

MEDICATIONS: 

Thyroid Medications:  yes

Which medication: Levothyroxine 

How Lon+ years

Osteoporosis Medications: no

 

 

 

EXAM MEASUREMENTS: 

Bone mineral densitometry was performed using the [employer] [facility].

Bone mineral density as measured about the Lumbar spine is:  

----- L1-L4(G/cm2): 1.034

T Score Values are as follows:

----- L1: -2.1

----- L2: -1.9

----- L3: -1.0

----- L4: -0.2

----- L1-L4: -1.2

 

Z Score Values are as follows:

----- L1: -1.1

----- L2: -0.9

----- L3: 0.0

----- L4: 0.8

----- L1-L4: -0.2

 

Bone mineral density has: Decreased -0.9% since study of: 2022

 

Bone mineral density about the R hip (g/cm2): 0.884

Bone mineral density about the L hip (g/cm2): 0.840

T Score values are as follows:

-----R Neck: -1.5

-----L Neck: -2.0

-----R Total: -1.0

-----L Total: -1.3

 

Z Score values are as follows:

-----R Neck: -0.3

-----L Neck: -0.7

-----R Total: 0.0

-----L Total: -0.4

 

Bone mineral density has: Increased 0.3% since study of: 2022

 

 

FRAX%s: The graph provided illustrates a 18.0% chance for a major osteoporotic fx and a 3.1% chance f
or the hips probability for fx in 10 years time.

 

 

 

IMPRESSION:

Osteopenia (T Score between -2.5 and -1).

 

There is slightly increased risk of fracture and the patient may be considered 

for treatment. 

 

Re-Screen 2-5 years.

 

NOTE:  T-SCORE=SD OF THE YOUNG ADULT MEAN.

## 2024-03-22 ENCOUNTER — HOSPITAL ENCOUNTER (OUTPATIENT)
Dept: HOSPITAL 47 - WWCWWP | Age: 69
End: 2024-03-22
Attending: SURGERY
Payer: MEDICARE

## 2024-03-22 VITALS
DIASTOLIC BLOOD PRESSURE: 82 MMHG | RESPIRATION RATE: 15 BRPM | SYSTOLIC BLOOD PRESSURE: 148 MMHG | HEART RATE: 64 BPM | TEMPERATURE: 98.2 F

## 2024-03-22 DIAGNOSIS — Z88.1: ICD-10-CM

## 2024-03-22 DIAGNOSIS — Z87.891: ICD-10-CM

## 2024-03-22 DIAGNOSIS — Z90.89: Primary | ICD-10-CM

## 2024-03-22 NOTE — P.PN
Subjective


Progress Note Date: 03/22/24


bilateral breast cancer:


right 12-20-19 stage I


left  invasive ductal cancer 10-; stage I








Subjective


Progress Note Date: 12-1-23


Principal diagnosis: 





new diagnosis left breast IDC


stage IA right breast invasive ductal cancer [id number]-; dx. 12-13-19


Right breast stage IA invasive ductal carcinoma, lumpectomy done January 2020, 

patient contralateral symmetry procedure of the left breast mammoplasty 

performed in December 2020


10-20-23


Stage IA right breast cancer


     Jyoti is a 67-year-old white female status post right breast lumpectomy 

and sentinel node biopsy done 01/15/2020.  She also had a left breast biopsy 

which revealed focal atypical ductal hyperplasia. She finished her radiation 

treatment on March 19th.  She also saw a medical oncologist and is now on 

Anestrazole. 





She is planning of some hip and knee discomfort, she is following with 

orthopedic surgery, she was recommended to have a knee replacement on the left 

side.  She is not complaining of any hot flashes. 


She had a bilateral mammogram performed on 822-22 this was benign BIRADS 2. 





She is not complaining of any new lumps masses or nodules of concern in either 

breast.





Note Medical oncology Dr. Avalos 9-2-22 reviewed





Note from DR. Mendez of 3-15-23 revealed patient was noted to be doing well 

with no evidence of recurrent cancer.





 Of importance is the fact of 2020 she underwent a CAT scan of the chest which 

revealed incomplete resolution of posterior basilar consolidation and/or 

atelectasis.  The anterior upper lung consolidation.  Could not exclude 

pneumonia or infectious etiology but suspect it may be related to her breast 

radiation treatment and correlation clinically was recommended.





She had a follow up  CT scan on November 24th, 2020 which revealed improved 

pleural thickening right upper lobe and interstitial changes from radiation 

therapy to the right breast were noted.  This is also being followed by 

radiation oncology. 





She is not complaining of any new lumps masses or nodules of concern in either 

breast.





9-14-23





The patient on 8-23-23 had a bilateral mammogram done, this revealed a new 

nodule in the right breast. Nothing of concern noted in the left breast. This 

led to a right breast diagnostic mammogram and ultrasound on 8-25-23. These led 

to a right breast ultrasound core biopsy on 9-6-23.  The core biopsy was felt to

be discordant from radiolsoy and needle localization and resection recommended. 

This was reviewed with Dr. Candelaria. Open resection in the operating room benign. 





She is continuing on her anastrozole.  She is scheduled at this time for total 

knee replacement on the left side.  He does not feel any new lumps masses or 

nodules of concern in either breast.  She is status post right breast lumpectomy

and sentinel node biopsy done on 1152020.  This was for a stage I invasive 

ductal carcinoma.  She was treated with radiation therapy which he completed on 

03/19/2020.  She is also on anastrozole.  She did not receive any chemotherapy.


10-20-23





The patient was seen by Dr. Avalos who was concerned about an are in the left 

breast. Additional x-ray and ultrasound were done, biopsy + IDC.  She had a 

diagnostic [place] on [address] this was felt to be BIRADS 0 and was 

followed by an ultrasound on the same day.  On the ultrasound a 1.5 cm area was 

identified and it was unable to determine if this was focal scar tissue were 

suspicious lesion.  Therefore core biopsy was obtained.  The core biopsy of this

site was done on 101123.  This was G2 ER+DE+ HER-2/karishma negative.  She 

tolerated the biopsy without difficulty.


MRI performed on 11223: Enhancement in the right breast was identified which 

was felt to be related to fat necrosis or seromas she has had recent right 

breast surgery


No lesions of concern noted in the left breast


genetic testing (-)


Patient was felt to be medically optimized for surgery by Dr. Beckman





3-22-24





Breast lumpectomy and sentinel node biopsy on 1- a grade 2 DCIS invasive 

ductal carcinoma 3 mm, DCIS, negative sentinel nodes, margins were negative for 

DCIS but close, this was ER/DE positive, HER2 negative. 





Underwent on 523 left breast lumpectomy, pathology revealed a 10 mm invasive 

ductal carcinoma nodes negative.  Her margins were negative.  She completed 

adjuvant radiation therapy.





Note medical oncology 2- reviewed there was no benefit from chemotherapy 

secondary to her Oncotype score and patient was put on exemestane


Note radiation oncology 3- reviewed the patient completed radiation 

therapy of the left breast on 2-





Underwent a right breast diagnostic mammogram on 3- which was benign BI-

RADS 2





Her left knee is scheduled to be replaced on April the 9th, 2024.





She is not concerned about any new lumps masses or nodules in either breast.











Family history:


Father: Esophageal cancer





Hormonal history:


Menarche: 13


G2 Ab2, no children, tubal ligation at 30


Menopause: 43


Birth control pills: 15 years


Hormones: None





Surgical history:


1.  Tubal ligation


2.  Bunionectomy


3.  Right breast lumpectomy and sentinel node biopsy, left breast biopsy


4.  Left breast reduction mammoplasty


5. left knee arthroscopic surgery








Medical history:


1.  Varicose veins


2. back pain 





Social history:


Smoke: Negative


Alcohol: Wine daily


Drugs: Negative





Review of systems:


Constitutional: Negative


HEENT: Negative


Ears: Tinnitus


Cardiovascular: Hypertension


Respiratory: Negative


GI: Negative


: Status post tubal ligation


Musculoskeletal: Arthritis; back pain


Neurologic: Negative


Psychiatric: Negative



































Objective





- Vital Signs


Vital signs: 


                                   Vital Signs











Temp  98.2 F   03/22/24 09:01


 


Pulse  64   03/22/24 09:01


 


Resp  15   03/22/24 09:01


 


BP  148/82   03/22/24 09:01


 


Pulse Ox  96   03/22/24 09:01


 


FiO2      








                                 Intake & Output











 03/21/24 03/22/24 03/22/24





 18:59 06:59 18:59


 


Weight   83.915 kg














- Constitutional


General appearance: Present: cooperative





- EENT


Eyes: Present: EOMI


ENT: Present: hearing grossly normal





- Neck


Neck: Present: normal ROM





- Respiratory


Respiratory: bilateral: CTA





- Cardiovascular


Heart sounds: normal: S1, S2





- Integumentary


Integumentary: Present: normal turgor





- Psychiatric


Psychiatric: Present: A&O x's 3, appropriate affect, intact judgment & insight





- Additional findings


Additional findings: 


Breast Exam:


BRA: 40C


Inspection: bilateral post op changes, grade 2 ptosis bilateral


Patient:


Right breast: Postop changes, no dominant masses or nodules of concern on multi-

positional exam, well healed scar recent biopsy, small seroma right breast upper

outer aspect smaller as per patinet


Right axilla: No adenopathy of concern


Left breast: Post surgical changes,  no dominate masses or nodules of concern


Left axilla: No adenopathy of concern


























 



































Assessment and Plan


Assessment: 


Impression:


Patient status post right breast lumpectomy/radiation therapy/hormone therapy 

for stage IA invasive ductal carcinoma; she subsequently  underwent a right 

breast open biopsy for discordant core biopsy which was benign


  left breast Lumpectomy sentinel node biopsy


Recent right breast mammogram 3-20-24 BIRAD2








Plan:


Bilateral mammogram in 6 months with examination at that time


The patient is going to have knee replacement in the interim


The patient is continuing on exemestane


Follow-up medical and radiation oncology


Follow-up here sooner any questions or concerns











Cc: Dr. Beckman

## 2024-04-09 ENCOUNTER — HOSPITAL ENCOUNTER (OUTPATIENT)
Dept: HOSPITAL 47 - OR | Age: 69
Discharge: HOME HEALTH | End: 2024-04-09
Attending: ORTHOPAEDIC SURGERY
Payer: MEDICARE

## 2024-04-09 VITALS — RESPIRATION RATE: 16 BRPM

## 2024-04-09 VITALS — DIASTOLIC BLOOD PRESSURE: 72 MMHG | SYSTOLIC BLOOD PRESSURE: 129 MMHG | HEART RATE: 71 BPM

## 2024-04-09 VITALS — TEMPERATURE: 97 F

## 2024-04-09 DIAGNOSIS — E78.5: ICD-10-CM

## 2024-04-09 DIAGNOSIS — Z79.899: ICD-10-CM

## 2024-04-09 DIAGNOSIS — G89.18: ICD-10-CM

## 2024-04-09 DIAGNOSIS — M17.12: Primary | ICD-10-CM

## 2024-04-09 DIAGNOSIS — Z88.8: ICD-10-CM

## 2024-04-09 DIAGNOSIS — I10: ICD-10-CM

## 2024-04-09 DIAGNOSIS — Z87.891: ICD-10-CM

## 2024-04-09 DIAGNOSIS — F32.A: ICD-10-CM

## 2024-04-09 DIAGNOSIS — E03.9: ICD-10-CM

## 2024-04-09 DIAGNOSIS — K21.9: ICD-10-CM

## 2024-04-09 PROCEDURE — 64999 UNLISTED PX NERVOUS SYSTEM: CPT

## 2024-04-09 PROCEDURE — 97530 THERAPEUTIC ACTIVITIES: CPT

## 2024-04-09 PROCEDURE — 73560 X-RAY EXAM OF KNEE 1 OR 2: CPT

## 2024-04-09 PROCEDURE — 97161 PT EVAL LOW COMPLEX 20 MIN: CPT

## 2024-04-09 PROCEDURE — 64448 NJX AA&/STRD FEM NRV NFS IMG: CPT

## 2024-04-09 PROCEDURE — 27447 TOTAL KNEE ARTHROPLASTY: CPT

## 2024-04-09 RX ADMIN — ACETAMINOPHEN PRN MG: 500 TABLET ORAL at 07:15

## 2024-04-09 RX ADMIN — CEFAZOLIN ONE MLS: 330 INJECTION, POWDER, FOR SOLUTION INTRAMUSCULAR; INTRAVENOUS at 09:45

## 2024-04-09 RX ADMIN — MIDAZOLAM ONE MG: 1 INJECTION INTRAMUSCULAR; INTRAVENOUS at 07:26

## 2024-04-09 RX ADMIN — GABAPENTIN PRN MG: 300 CAPSULE ORAL at 07:15

## 2024-04-09 RX ADMIN — POTASSIUM CHLORIDE SCH MLS: 14.9 INJECTION, SOLUTION INTRAVENOUS at 06:57

## 2024-04-09 RX ADMIN — MELOXICAM PRN MG: 7.5 TABLET ORAL at 07:15

## 2024-04-09 RX ADMIN — DEXAMETHASONE SODIUM PHOSPHATE ONE MG: 4 INJECTION, SOLUTION INTRAMUSCULAR; INTRAVENOUS at 07:15

## 2024-04-09 RX ADMIN — SODIUM CHLORIDE PRN MG: 9 INJECTION, SOLUTION INTRAVENOUS at 11:15

## 2024-04-09 RX ADMIN — HYDROMORPHONE HYDROCHLORIDE PRN MG: 1 INJECTION, SOLUTION INTRAMUSCULAR; INTRAVENOUS; SUBCUTANEOUS at 11:30

## 2024-04-09 NOTE — P.OP
Date of Procedure: 04/09/24


Preoperative Diagnosis: 


severe osteoarthritis left knee


Postoperative Diagnosis: 


severe osteoarthritis left knee


Procedure(s) Performed: 


left total knee


Implants: 


[employer] Journey II CR Oxinium cruciate retaining femoral component size 

5, left


[employer] Journey nonporous tibial baseplate size 4, left


[employer] Journey II, XLPE Deep Dished articular insert, size 11 mm, Size 

3-4, left


[employer] Journey Balbina II resurfacing patellar component, oval, 29 mm


All components were cemented using Palacos R bone cement


The articulation is Oxinium on polyethylene


Anesthesia: JED


Surgeon: Tino Baez


Assistant #1: Radha Walsh


Estimated Blood Loss (ml): 30


Pathology: none sent


Condition: stable


Disposition: PACU


Indications for Procedure: 


The patient's knee is end-stage, and conservative management has failed.  The 

operation of knee replacement has been discussed at length in the office, as 

well as potential risks and complications.  These are inclusive of, but not 

limited to: Infection, bleeding, scarring, discomfort, stiffness, blood vessel 

and nerve damage, need for further surgery, failure to relieve symptoms, 

persistence, recurrence, or worsening of problems, loosening, dislocation, wear,

blood clot, pulmonary embolism, death, gait dysfunction, stiffness, and other 

risks as discussed in the office.  Patient elects to proceed and the consent 

form has been signed.


Operative Findings: 


the operative findings are consistent with severe osteoarthritis of the left 

knee


Description of Procedure: 


The patient was seen in the preoperative area, the consent was reviewed and the 

operative site was marked with a skin marker.  The patient verified the 

procedure and the operative site.  An adductor canal pain catheter and an iPACK 

block were placed by anesthesia in the preoperative area.  The patient was then 

brought to the operating room and positioned on the operating room table in the 

supine position.  Preoperative antibiotics and a gram of tranexamic acid were 

given intravenously.  A general anesthetic was administered by the anesthesia 

department.  Care was taken to make sure that all pressure points were adequ

ately padded.  A tourniquet was placed on the upper thigh and the lower 

extremity was prepped with ChloraPrep and draped in usual sterile fashion.    A 

universal time-out was then performed which confirmed the patient's name, 

surgical site, ALLERGIES, and consent.





The lower extremity was then exsanguinated and tourniquet was inflated to 250 

mmHg.  A standard anterior midline approach to the knee was performed.  The skin

and subcutaneous tissue were sharply dissected down to the patellar tendon.  A 

medial parapatellar arthrotomy was then performed.  The knee was then extended, 

the patellar was everted, and the knee was flexed.  The infra-patellar fat pad 

was removed in order to enhance exposure.  The anterior horns of both menisci 

were excised, and a release was performed to the posterior medial aspect of the 

knee.  On gross visual inspection, there was complete loss of articular 

cartilage in the medial and patellofemoral joint spaces.  There was also 

significant cartilage damage in the lateral compartment.  There were multiple 

periarticular osteophytes globally about the knee which were then removed with a

Ronguer.  The femoral canal was then opened with the 9.5 mm intramedullary 

drill.  The 8 mm intramedullary hannah was then inserted into the femoral canal 

with the distal femoral cutting guide set for 5 of valgus.  The distal femoral 

cutting block was then pinned in place.  The intramedullary hannah was then 

removed, and the distal femur was then cut.  The cutting block was then removed 

and the cut was checked for symmetry.  The resected bone was then measured to 

confirm the appropriate distal femoral resection.  Next, the sizing guide was 

then placed and set for 3 external rotation based off of the epicondylar axis 

and Karin's line.  Pins were then placed and the drill holes, and the femur 

was sized with the sizing stylus.  The pins were then removed, and the sizing 

guide was then removed.  The spikes of the appropriate size femoral block was 

then placed into the predrilled holes, and malleted into place.  Two 45 mm pins 

were then placed into the fixation holes on the cutting block.  An milena wing 

was then used to ensure there would be no notching with the anterior cut.  The 

anterior condyles were cut without notching.  The anterior chord cut was then 

performed, followed by the posterior cut, posterior chamfer cut, and the 

anterior chamfer cut.  The collateral ligaments were protected during the entire

process.  The cutting block was then removed.  Any remaining bone and 

osteophytes were removed from the femur with a Ronguer.





Attention was then directed to the tibia.  The remaining ACL was removed with a 

Ronguer, and the tibia was then gently subluxed forward with a large bent knee 

retractor.  Any remaining menisci were excised.  The posterior lateral corner 

was cauterized in order to coagulate the lateral geniculate artery.  The extra 

medullary tibial cutting guide was then placed, set for the appropriate 

rotation, slope, and depth of resection.  The proximal tibia cutting guide was 

then pinned in place.  Proximal tibia was then cut and sized.  A curved 

osteotome was then used to remove any posterior osteophytes from the distal 

femur. 





The femoral trial was placed.  A narrow saw blade was then used to remove the 

anterior intracondylar femoral bone.  The CR notch trial was then placed.  The 

tibial trial was placed with the appropriate-sized insert.  The knee was able to

fully extend and flex to 130 and was stable throughout all range of motion.  

The knee was then extended and the patella was everted.  Patella was then 

measured, and then using an osteotomy guide, the patella was cut at the 

appropriate level.  The patellar component was sized.  The patellar drill guide 

was placed and the patella was drilled.  The patella trial was then placed.  The

knee was then taken through range of motion with the patella trial and the 

patella tracked normally using the no thumbs technique.  The patella trial was 

then removed.  The knee was then flexed and lug holes were drilled through the 

femoral trial and the femoral trial was then removed.  The tibial was then re-

exposed, and the tibial broach guide was then pinned in place after it was set 

for the appropriate rotation to allow for the most coverage without overhang.  

The tibia was then reamed and broached.





The femoral canal was plugged with autologous bone.  The cut surfaces of bone 

were then irrigated with pulsatile lavage. The knee was also irrigated with 

Irrisept solution.  The components were then opened, the cement was mixed.  

Cement was placed on the backside of the femoral, tibial, and patellar 

components.  Cement was then applied to the tibial surface and pressurized into 

the surface using finger pressurization technique.  The tibial component was 

then applied and excess cement was removed after it was impacted securely noted 

to be flush with the cut surface. 





In similar fashion, the cement was applied to the cut femoral surface, 

pressurized and using finger pressurization the component was impacted in place.

 Excess cement was removed.  The polyethylene spacer was then implanted and 

locked into position.  Patellar component was then applied in a similar 

technique and the patellar clamp was used to hold patella in place while the 

cement hardened.  The knee was held in full extension while the cement hardened.

 Once the cement had fully hardened, the knee was reinspected.  Any other cement

extrusion was removed the final range of motion testing showed range of motion 

from 0-130 with excellent stability, both medial and laterally and appropriate 

alignment of the leg.  Patella tracked normally.





After the cemented hardened, the tourniquet was released and hemostasis was 

obtained.  A second gram of transexamic acid was given intravenously.  The knee 

was again irrigated.  The knee was again taken through range of motion and found

to be stable throughout all range of motion of 0-130, and the patella tracked 

normally.  The fascia was then closed with 0 Vicryl followed by #2 strata fix 

suture.  The subcutaneous tissue was closed with 3-0 Vicryl and 3-0 strata fix. 

Exofin glue was used for the skin and placed with the knee in flexion.  After 

the glue had dried, and Optafoam silver impregnated dressing was applied.  A 

lightly compressive dressing was applied using web roll and Ace wrap.  Patient 

was then transferred to the stretcher and taken to recovery room in stable 

condition.  Sponge and needle counts were correct.  





The assistant PRITI Nichols was required due the complexity surgery and the 

need for a skilled surgical assistant.  She assisted in positioning, draping, 

retraction, and closure of the wound.

## 2024-04-09 NOTE — P.ANPRN
Procedure Note - Anesthesia





- Nerve Block Performed


  ** Left iPack Single


Time Out Performed: Yes


Date of Procedure: 04/09/24


Procedure Start Time: 07:26


Procedure Stop Time: 07:31


Location of Patient: PreOp


Indication: Acute Post-Operative Pain, Analgesia, Requested by Surgeon


Sedation Type: Sedate with meaningful contact maintained


Preparation: Sterile Prep


Position: Right Lateral


Catheter: None


Needle Types: Pajunk


Needle Gauge: 21


Ultrasound used to visualize needle placement: Yes


Ultrasound used to observe medication spread: Yes


Injectate: 0.5% Ropivacaine (see comment for volume) (Ropiv 25ml +decadron 4mg)


Blood Aspirated: No


Pain Paresthesia on Injection Noted: No


Resistance on Injection: Normal


Image Stored and Saved: Yes


Events: Uneventful and Well Tolerated

## 2024-04-09 NOTE — P.ANPRN
Procedure Note - Anesthesia





- Nerve Block Performed


  ** Left Adductor Canal Infusion


Time Out Performed: Yes


Date of Procedure: 04/09/24


Procedure Start Time: 07:32


Procedure Stop Time: 07:40


Location of Patient: PreOp


Indication: Acute Post-Operative Pain, Analgesia, Requested by Surgeon


Sedation Type: Sedate with meaningful contact maintained


Preparation: Sterile Prep


Position: Supine


Catheter: Indwelling


Needle Types: On-Q


Ultrasound used to visualize needle placement: Yes


Ultrasound used to observe medication spread: Yes


Injectate: 0.5% Ropivacaine (see comment for volume) (Ropiv 20ml +decadron 4mg)


Blood Aspirated: No


Pain Paresthesia on Injection Noted: No


Resistance on Injection: Normal


Image Stored and Saved: Yes


Events: Uneventful and Well Tolerated

## 2024-04-09 NOTE — XR
EXAMINATION TYPE: XR knee limited LT

 

DATE OF EXAM: 4/9/2024

 

COMPARISON: NONE

 

TECHNIQUE: Two views submitted

 

HISTORY: Post op

 

FINDINGS:

There is a prosthetic  knee in near anatomic alignment.  There is soft tissue edema and soft tissue e
mphysema\Jostin. 

 

IMPRESSION:

1. Postoperative change.  Appears in near-anatomic alignment

## 2024-09-23 ENCOUNTER — HOSPITAL ENCOUNTER (OUTPATIENT)
Dept: HOSPITAL 47 - RADMAMWWP | Age: 69
Discharge: HOME | End: 2024-09-23
Attending: SURGERY
Payer: MEDICARE

## 2024-09-23 PROCEDURE — 77066 DX MAMMO INCL CAD BI: CPT

## 2024-09-23 PROCEDURE — 77062 BREAST TOMOSYNTHESIS BI: CPT

## 2024-09-23 NOTE — MM
Reason for Exam: Hx of breast cancer, conservation therapy. 

Last mammogram was performed 1 year(s) and 1 month(s) ago. 





Patient History: 

Menarche at age 12. Patient has no children. Postmenopausal. Breast cancer, right, age 64. Breast

cancer, left, age 68. Breast cancer, left, age 68. Previous chest radiation therapy at age 64.

Hormonal Contraceptives for 11 years from age 19 until age 30. 12/05/2023, Lumpectomy on the Left

side. 12/05/2023, Malignant MG pre op needle loc LT on the left side. 10/11/2023, Malignant US

biopsy breast VAD LT on the left side. 09/19/2023, Benign MG pre op needle loc RT on the right side.

09/06/2023, US biopsy breast VAD RT on the Right side. Excisional Biopsy on the Right side. 12/2020,

Reduction on the Left side. 01/15/2020, Lumpectomy on the Right side. 1/15/2020, Malignant Core

Biopsy on the right side. 1/15/2020, Malignant Core Biopsy on the left side. 1/30/2001, Benign

Excisional Biopsy on the left side. 2020, Radiation Therapy on the right side. 





Prior Study Comparison: 

12/3/1992  Screening Mammogram, Unknown. 8/29/2014 Right Diagnostic Ultrasound, [facility]. 3/11/2015 Right

Diagnostic Mammogram, [facility]. 6/2/2017 Bilateral Screening Mammogram, [facility]. 6/15/2017 Left Diagnostic

Ultrasound, [facility]. 1/22/2018 Left Diagnostic Mammogram, [facility]. 1/22/2018 Left Diagnostic Ultrasound,

[facility]. 10/2/2018 Bilateral Screening Mammogram, [facility]. 10/28/2019 Bilateral Screening Mammogram, [facility].

11/7/2019 Bilateral Diagnostic Mammogram, [facility]. 11/7/2019 Bilateral Diagnostic Ultrasound, [facility].

8/11/2020 Bilateral Diagnostic Mammogram, [facility]. 8/18/2021 Bilateral Diagnostic Mammogram, [facility].

8/22/2022 Bilateral MG 3D diag mammo w/cad ENDY, [facility]. 8/23/2023 Bilateral MG 3D screening mammo

w/cad, [facility]. 8/25/2023 Right US breast workup limited RT, [facility]. 8/25/2023 Right MG 3D work up w/cad

RT, [facility]. 9/6/2023 Right MG diagnostic mammo RT wo CAD, [facility]. 9/29/2023 Left MG 3D diag mammo w/cad

LT, [facility]. 9/29/2023 Left US breast limited LT, [facility]. 10/11/2023 Left MG diagnostic mammo LT wo CAD.,

[facility]. 3/20/2024 Right MG 3D diag mammo w/cad RT, [facility]. 





Tissue Density: 

The breasts are heterogeneously dense, which may obscure small masses.





Findings: 

Analyzed By CAD. 

Pattern appears stable. There are multiple benign-appearing calcifications are present previously.

Post lumpectomy changes are within the right breast which appear stable.



There is a new lumpectomy site left upper breast. This area has a scar marker. This area also

correlates with the palpable change reported by the patient. Suspicious underlying abnormality is

not mammographically apparent. Clinical management of this area is recommended.



No suspicious groups of microcalcifications, spiculated or lobular masses, architectural distortion

or other secondary signs of malignancy are mammographically apparent. 





Overall Assessment: Probably benign, BI-RAD 3





Management: 

Diagnostic Mammogram of the left breast in 6 months.

A negative mammogram report should not preclude additional follow up of suspicious palpable

abnormalities.

Patient should continue monthly self breast exam.

A clinical breast exam by your physician is recommended on an annual basis and results should be

correlated with mammographic findings.



Note on Linda scores and lifetime risk:

1. A Linda score greater than 3% is considered moderate risk. If this is the case, consider

specialist referral to assess eligibility for a risk reducing agent.

2. If overall lifetime risk for the development of breast cancer is 20% or higher, the patient may

qualify for future screening with alternating mammogram and breast MRI.



[facility], Workstation: [id number], 9/23/2024 10:12 AM.



Electronically signed and approved by: Dipesh Manzo D.O. Radiologis

## 2024-09-27 ENCOUNTER — HOSPITAL ENCOUNTER (OUTPATIENT)
Dept: HOSPITAL 47 - WWCWWP | Age: 69
End: 2024-09-27
Attending: SURGERY
Payer: MEDICARE

## 2024-09-27 VITALS
RESPIRATION RATE: 16 BRPM | DIASTOLIC BLOOD PRESSURE: 84 MMHG | TEMPERATURE: 98.3 F | HEART RATE: 66 BPM | SYSTOLIC BLOOD PRESSURE: 145 MMHG

## 2024-09-27 DIAGNOSIS — Z48.817: ICD-10-CM

## 2024-09-27 DIAGNOSIS — R92.8: Primary | ICD-10-CM

## 2024-09-27 DIAGNOSIS — N60.92: ICD-10-CM

## 2024-09-27 DIAGNOSIS — Z92.3: ICD-10-CM

## 2024-09-27 DIAGNOSIS — Z88.8: ICD-10-CM

## 2024-09-27 DIAGNOSIS — Z87.891: ICD-10-CM

## 2024-09-27 DIAGNOSIS — C50.411: ICD-10-CM

## 2024-09-27 DIAGNOSIS — C50.412: ICD-10-CM

## 2024-09-27 DIAGNOSIS — Z17.0: ICD-10-CM

## 2024-09-27 DIAGNOSIS — Z91.041: ICD-10-CM

## 2024-09-27 DIAGNOSIS — N64.89: ICD-10-CM

## 2024-09-27 NOTE — P.PN
Subjective


Progress Note Date: 09/27/24 03/22/24


bilateral breast cancer:


right 12-20-19 stage I


left  invasive ductal cancer 10-; stage I








Subjective


Progress Note Date: 12-1-23


Principal diagnosis: 





new diagnosis left breast IDC


stage IA right breast invasive ductal cancer [id number]-; dx. 12-13-19


Right breast stage IA invasive ductal carcinoma, lumpectomy done January 2020, 

patient contralateral symmetry procedure of the left breast mammoplasty 

performed in December 2020


10-20-23


Stage IA right breast cancer


     Jyoti is a 67-year-old white female status post right breast lumpectomy 

and sentinel node biopsy done 01/15/2020.  She also had a left breast biopsy 

which revealed focal atypical ductal hyperplasia. She finished her radiation 

treatment on March 19th.  She also saw a medical oncologist and is now on 

Anestrazole. 





She is planning of some hip and knee discomfort, she is following with 

orthopedic surgery, she was recommended to have a knee replacement on the left 

side.  She is not complaining of any hot flashes. 


She had a bilateral mammogram performed on 822-22 this was benign BIRADS 2. 





She is not complaining of any new lumps masses or nodules of concern in either 

breast.





Note Medical oncology Dr. Avalos 9-2-22 reviewed





Note from DR. Mendez of 3-15-23 revealed patient was noted to be doing well 

with no evidence of recurrent cancer.





 Of importance is the fact of 2020 she underwent a CAT scan of the chest which 

revealed incomplete resolution of posterior basilar consolidation and/or 

atelectasis.  The anterior upper lung consolidation.  Could not exclude 

pneumonia or infectious etiology but suspect it may be related to her breast 

radiation treatment and correlation clinically was recommended.





She had a follow up  CT scan on November 24th, 2020 which revealed improved 

pleural thickening right upper lobe and interstitial changes from radiation 

therapy to the right breast were noted.  This is also being followed by 

radiation oncology. 





She is not complaining of any new lumps masses or nodules of concern in either 

breast.





9-14-23





The patient on 8-23-23 had a bilateral mammogram done, this revealed a new 

nodule in the right breast. Nothing of concern noted in the left breast. This 

led to a right breast diagnostic mammogram and ultrasound on 8-25-23. These led 

to a right breast ultrasound core biopsy on 9-6-23.  The core biopsy was felt to

be discordant from radiolsoy and needle localization and resection recommended. 

This was reviewed with Dr. Candelaria. Open resection in the operating room benign. 





She is continuing on her anastrozole.  She is scheduled at this time for total 

knee replacement on the left side.  He does not feel any new lumps masses or 

nodules of concern in either breast.  She is status post right breast lumpectomy

and sentinel node biopsy done on 1152020.  This was for a stage I invasive 

ductal carcinoma.  She was treated with radiation therapy which he completed on 

03/19/2020.  She is also on anastrozole.  She did not receive any chemotherapy.


10-20-23





The patient was seen by Dr. Avalos who was concerned about an are in the left 

breast. Additional x-ray and ultrasound were done, biopsy + IDC.  She had a 

diagnostic [place] [address] this was felt to be BIRADS 0 and was 

followed by an ultrasound on the same day.  On the ultrasound a 1.5 cm area was 

identified and it was unable to determine if this was focal scar tissue were 

suspicious lesion.  Therefore core biopsy was obtained.  The core biopsy of this

site was done on 101123.  This was G2 ER+RI+ HER-2/karishma negative.  She 

tolerated the biopsy without difficulty.


MRI performed on 11223: Enhancement in the right breast was identified which 

was felt to be related to fat necrosis or seromas she has had recent right 

breast surgery


No lesions of concern noted in the left breast


genetic testing (-)


Patient was felt to be medically optimized for surgery by Dr. Beckman





3-22-24





Breast lumpectomy and sentinel node biopsy on 1- a grade 2 DCIS invasive 

ductal carcinoma 3 mm, DCIS, negative sentinel nodes, margins were negative for 

DCIS but close, this was ER/RI positive, HER2 negative. 





Underwent on 523 left breast lumpectomy, pathology revealed a 10 mm invasive 

ductal carcinoma nodes negative.  Her margins were negative.  She completed 

adjuvant radiation therapy.





Note medical oncology 2- reviewed there was no benefit from chemotherapy 

secondary to her Oncotype score and patient was put on exemestane


Note radiation oncology 3- reviewed the patient completed radiation 

therapy of the left breast on 2-





Underwent a right breast diagnostic mammogram on 3- which was benign BI-

RADS 2





Her left knee is scheduled to be replaced on April the 9th, 2024.





She is not concerned about any new lumps masses or nodules in either breast.





9-27-24





69 year old with a diagnosis of bilateral breast cancers


right 12-20-19 stage I


left  invasive ductal cancer 10-; stage I





Right breast stage IA invasive ductal carcinoma, lumpectomy done January 2020, 

patient contralateral symmetry procedure of the left breast mammoplasty 

performed in December 2020


treated with radiation therapy and anestrazole





daignosed with a left breast 


The core biopsy of this site was done on 101123.  This was G2 ER+RI+ HER-2/karishma

negative.  She tolerated the biopsy without difficulty.


MRI performed on 11223: Enhancement in the right breast was identified which 

was felt to be related to fat necrosis or seromas she has had recent right 

breast surgery


No lesions of concern noted in the left breast





left breast cancer treated with Underwent on 523 left breast lumpectomy, 

pathology revealed a 10 mm invasive ductal carcinoma nodes negative.  Her 

margins were negative.  She completed adjuvant radiation therapy.





Note medical oncology 2- reviewed there was no benefit from chemotherapy 

secondary to her Oncotype score and patient was put on exemestane


Note radiation oncology 3- reviewed the patient completed radiation 

therapy of the left breast on 2-





bilateral mammogram on 9-23-24 BIRAD 3 repeat left mammogram in 6 months 

personally interpreted





She is complaining of some fullness in the UOQ of the left breast for about 

three weeks, it has not changed. 








    








Family history:


Father: Esophageal cancer





Hormonal history:


Menarche: 13


G2 Ab2, no children, tubal ligation at 30


Menopause: 43


Birth control pills: 15 years


Hormones: None





Surgical history:


1.  Tubal ligation


2.  Bunionectomy


3.  Right breast lumpectomy and sentinel node biopsy, left breast biopsy


4.  Left breast reduction mammoplasty


5. left knee arthroscopic surgery


6.  left lumpectomy and SNB


7. left knee replacement








Medical history:


1.  Varicose veins


2. back pain 





Social history:


Smoke: Negative


Alcohol: Wine daily


Drugs: Negative





Review of systems:


Constitutional: Negative


HEENT: Negative


Ears: Tinnitus


Cardiovascular: Hypertension


Respiratory: Negative


GI: Negative


: Status post tubal ligation


Musculoskeletal: Arthritis; back pain


Neurologic: Negative


Psychiatric: Negative









































Objective





- Vital Signs


Vital signs: 


                                   Vital Signs











Temp  98.3 F   09/27/24 09:45


 


Pulse  66   09/27/24 09:45


 


Resp  16   09/27/24 09:45


 


BP  145/84   09/27/24 09:45


 


Pulse Ox  97   09/27/24 09:45


 


FiO2      








                                 Intake & Output











 09/26/24 09/27/24 09/27/24





 18:59 06:59 18:59


 


Weight   82.554 kg














- Constitutional


General appearance: Present: cooperative





- EENT


Eyes: Present: EOMI


ENT: Present: hearing grossly normal





- Neck


Neck: Present: normal ROM





- Respiratory


Respiratory: bilateral: CTA





- Cardiovascular


Rhythm: regular


Heart sounds: normal: S1, S2





- Integumentary


Integumentary: Present: normal turgor





- Musculoskeletal


Musculoskeletal: Present: gait normal





- Psychiatric


Psychiatric: Present: A&O x's 3, appropriate affect, intact judgment & insight





- Additional findings


Additional findings: 


Breast Exam:


BRA: 40C


Inspection: bilateral post op changes, grade 2 ptosis bilateral


Patient:


Right breast: Postop changes, no dominant masses or nodules of concern on multi-

positional exam, well healed scar recent biopsy, small seroma right breast upper

outer aspect smaller as per patinet


Right axilla: No adenopathy of concern


Left breast: Post surgical changes,  no dominate masses or nodules of concern;  

at the medial aspect of the axillary incision there is some slight nodularity 

which most likely represents scar tissue


Left axilla: No adenopathy of concern














Assessment and Plan


Assessment: 


Impression:


Patient status post right breast lumpectomy/radiation therapy/hormone therapy 

for stage IA invasive ductal carcinoma; she subsequently  underwent a right 

breast open biopsy for discordant core biopsy which was benign


  left breast Lumpectomy sentinel node biopsy


 right breast mammogram 3-20-24 BIRAD2; bilateral mammogram 9-23-24 BIRAD 3 

repeat left mammogram in 6 months, 


The axillary medial incision most likely representing scar tissue








Plan:


left breast mammogram in 6 months with examination at that time


Obtain note from medical oncology


Bilateral upper outer quadrant ultrasound of the breast the area of seroma in 

the right breast, in the area of increased nodularity in the left axillary 

incision at the medial aspect


Patient to follow-up after bilateral ultrasounds performed


The patient is continuing on exemestane


Follow-up medical and radiation oncology


Follow-up here sooner any questions or concerns











Cc: Dr. Beckman

## 2024-10-08 ENCOUNTER — HOSPITAL ENCOUNTER (OUTPATIENT)
Dept: HOSPITAL 47 - RADUSWWP | Age: 69
Discharge: HOME | End: 2024-10-08
Attending: SURGERY
Payer: MEDICARE

## 2024-10-08 DIAGNOSIS — Z85.3: ICD-10-CM

## 2024-10-08 DIAGNOSIS — N63.20: ICD-10-CM

## 2024-10-08 DIAGNOSIS — N63.10: Primary | ICD-10-CM

## 2024-10-08 DIAGNOSIS — Z78.0: ICD-10-CM

## 2024-10-08 NOTE — USB
Reason for Exam: Clinical finding. 





Patient History: 

Menarche at age 12. Patient has no children. Postmenopausal. Breast cancer, right, age 64. Breast

cancer, left, age 68. Breast cancer, left, age 68. Previous chest radiation therapy at age 64.

Hormonal Contraceptives for 11 years from age 19 until age 30. 12/05/2023, Lumpectomy on the Left

side. 12/05/2023, Malignant MG pre op needle loc LT on the left side. 10/11/2023, Malignant US

biopsy breast VAD LT on the left side. 09/19/2023, Benign MG pre op needle loc RT on the right side.

09/06/2023, US biopsy breast VAD RT on the Right side. Excisional Biopsy on the Right side. 12/2020,

Reduction on the Left side. 01/15/2020, Lumpectomy on the Right side. 1/15/2020, Malignant Core

Biopsy on the right side. 1/15/2020, Malignant Core Biopsy on the left side. 1/30/2001, Benign

Excisional Biopsy on the left side. 2020, Radiation Therapy on the right side. 

Technique: 

Method: Targeted.  





Prior Study Comparison: 

10/11/2023 Left MG diagnostic mammo LT wo CAD., [facility]. 3/20/2024 Right MG 3D diag mammo w/cad RT, [facility].

9/23/2024 Bilateral MG 3D diag mammo w/cad ENDY, [facility]. 





Findings: 

The upper section of the breast of the right breast, the lateral section of the breast of the left

breast and the area of palpable concern of the left breast were scanned.

Essentially stable seroma right breast at the site of prior lumpectomy measuring 3 x 2.3 cm. No

solid mass is detected. At the left sided lumpectomy site which correlates to the 2:00 position is

postoperative change and tiny seroma seen. 





Overall Assessment: Benign, BI-RAD 2





Management: 

Diagnostic Mammogram of both breasts in 1 year.

A clinical breast exam by your physician is recommended on an annual basis and results should be

correlated with mammographic findings.  This exam should not preclude additional follow-up of

suspicious palpable abnormalities.  Results were given to the patient verbally at the time of exam.



[facility], Workstation: [id number], 10/8/2024 11:25 AM.



Electronically signed and approved by: Leopold M. Fregoli, M.D. Radiologis

## 2024-10-25 ENCOUNTER — HOSPITAL ENCOUNTER (OUTPATIENT)
Dept: HOSPITAL 47 - WWCWWP | Age: 69
End: 2024-10-25
Attending: SURGERY
Payer: MEDICARE

## 2024-10-25 VITALS
RESPIRATION RATE: 17 BRPM | TEMPERATURE: 97.9 F | SYSTOLIC BLOOD PRESSURE: 131 MMHG | DIASTOLIC BLOOD PRESSURE: 75 MMHG | HEART RATE: 70 BPM

## 2024-10-25 NOTE — P.PN
Subjective


Progress Note Date: 10/25/24


10-25-24


Principal diagnosis:





right 12-20-19 stage I


left  invasive ductal cancer 10-; stage I, diagnosed after she had had 

left  mammoplasty 2020





10-24-25





69 year old with a diagnosis of bilateral breast cancers


right 12-20-19 stage I


left  invasive ductal cancer 10-; stage I





Right breast stage IA invasive ductal carcinoma, lumpectomy done January 2020, 

patient contralateral symmetry procedure of the left breast mammoplasty 

performed in December 2020


treated with radiation therapy and anestrazole





daignosed with a left breast 


The core biopsy of this site was done on 101123.  This was G2 ER+LA+ HER-2/karishma

negative.  She tolerated the biopsy without difficulty.


MRI performed on 11223: Enhancement in the right breast was identified which 

was felt to be related to fat necrosis or seromas she has had recent right 

breast surgery


No lesions of concern noted in the left breast





left breast cancer treated with Underwent on 523 left breast lumpectomy, 

pathology revealed a 10 mm invasive ductal carcinoma nodes negative.  Her 

margins were negative.  She completed adjuvant radiation therapy.





Note medical oncology 2- reviewed there was no benefit from chemotherapy 

secondary to her Oncotype score and patient was put on exemestane


Note radiation oncology 3- reviewed the patient completed radiation 

therapy of the left breast on 2-





bilateral mammogram on 9-23-24 BIRAD 3 repeat left mammogram in 6 months 

personally interpreted





She was complaining of some fullness in the UOQ of the left breast for about 

three weeks prior to her last visit, therefore bilateral Ultrasound bilateral 

breast done on 10-8-24 BIRAD 2. This was personally reviewed.  





She presents today for discussion of ultrasound results, and risk management for

breast cancer. 








Family history:


Father: Esophageal cancer





Hormonal history:


Menarche: 13


G2 Ab2, no children, tubal ligation at 30


Menopause: 43


Birth control pills: 15 years


Hormones: None





Surgical history:


1.  Tubal ligation


2.  Bunionectomy


3.  Right breast lumpectomy and sentinel node biopsy, left breast biopsy


4.  Left breast reduction mammoplasty


5. left knee arthroscopic surgery


6.  left lumpectomy and SNB


7. left knee replacement








Medical history:


1.  Varicose veins


2. back pain 





Social history:


Smoke: Negative


Alcohol: Wine daily


Drugs: Negative





Review of systems:


Constitutional: Negative


HEENT: Negative


Ears: Tinnitus


Cardiovascular: Hypertension


Respiratory: Negative


GI: Negative


: Status post tubal ligation


Musculoskeletal: Arthritis; back pain


Neurologic: Negative


Psychiatric: Negative















































Objective





- Vital Signs


Vital signs: 


                                   Vital Signs











Temp  97.9 F   10/25/24 09:35


 


Pulse  70   10/25/24 09:35


 


Resp  17   10/25/24 09:35


 


BP  131/75   10/25/24 09:35


 


Pulse Ox  95   10/25/24 09:35


 


FiO2      








                                 Intake & Output











 10/24/24 10/25/24 10/25/24





 18:59 06:59 18:59


 


Weight   81.647 kg














- Constitutional


General appearance: Present: cooperative





- Neck


Neck: Present: normal ROM





- Integumentary


Integumentary: Present: normal turgor





- Musculoskeletal


Musculoskeletal: Present: gait normal





- Psychiatric


Psychiatric: Present: A&O x's 3, appropriate affect, intact judgment & insight





- Additional findings


Additional findings: 


Breast Exam:





examination from last visit, deferred exam today


BRA: 40C


Inspection: bilateral post op changes, grade 2 ptosis bilateral


Patient:


Right breast: Postop changes, no dominant masses or nodules of concern on multi-

positional exam, well healed scar recent biopsy, small seroma right breast upper

outer aspect smaller as per patinet


Right axilla: No adenopathy of concern


Left breast: Post surgical changes,  no dominate masses or nodules of concern;  

at the medial aspect of the axillary incision there is some slight nodularity 

which most likely represents scar tissue


Left axilla: No adenopathy of concern











Assessment and Plan


Assessment: 


Impression:


Patient status post right breast lumpectomy/radiation therapy/hormone therapy 

for stage IA invasive ductal carcinoma; she subsequently  underwent a right 

breast open biopsy for discordant core biopsy which was benign


  left breast Lumpectomy sentinel node biopsy


 right breast mammogram 3-20-24 BIRAD2; bilateral mammogram 9-23-24 BIRAD 3 

repeat left mammogram in 6 months, 


The axillary medial incision most likely representing scar tissue





ultrasound bilateral BIRAD 2, 10-8-24








Plan:


left breast mammogram in March 2025 with examination at that time, bilateral 

mammogram in September 2025


Obtain note from medical oncology


The patient is continuing on exemestane


Follow-up medical oncology


Follow-up here sooner any questions or concerns





20 minutes spent on reviewing patient chart, personal review of ultrasound, and 

discussion with patient. 











Cc: Dr. Beckman Adult

## 2025-03-21 ENCOUNTER — HOSPITAL ENCOUNTER (OUTPATIENT)
Dept: HOSPITAL 47 - RADMAMWWP | Age: 70
Discharge: HOME | End: 2025-03-21
Attending: SURGERY
Payer: MEDICARE

## 2025-03-21 DIAGNOSIS — R92.322: Primary | ICD-10-CM

## 2025-03-21 DIAGNOSIS — Z85.3: ICD-10-CM

## 2025-03-21 DIAGNOSIS — Z78.0: ICD-10-CM

## 2025-03-21 PROCEDURE — 77061 BREAST TOMOSYNTHESIS UNI: CPT

## 2025-03-21 PROCEDURE — 77065 DX MAMMO INCL CAD UNI: CPT

## 2025-03-21 NOTE — MM
Reason for Exam: Follow-up at short interval from prior study. 

Last screening mammogram was performed 6 month(s) ago.





Patient History: 

Menarche at age 12. Patient has no children. Postmenopausal. Breast cancer, right, age 64. Breast

cancer, left, age 68. Breast cancer, left, age 68. Previous chest radiation therapy at age 64.

Hormonal Contraceptives for 11 years from age 19 until age 30. 12/05/2023, Lumpectomy on the Left

side. 12/05/2023, Malignant MG pre op needle loc LT on the left side. 10/11/2023, Malignant US

biopsy breast VAD LT on the left side. 09/19/2023, Benign MG pre op needle loc RT on the right side.

09/06/2023, US biopsy breast VAD RT on the Right side. Excisional Biopsy on the Right side. 12/2020,

Reduction on the Left side. 01/15/2020, Lumpectomy on the Right side. 1/15/2020, Malignant Core

Biopsy on the right side. 1/15/2020, Malignant Core Biopsy on the left side. 1/30/2001, Benign

Excisional Biopsy on the left side. 2024, Radiation Therapy on the left side. 2020, Radiation

Therapy on the right side. 





Prior Study Comparison: 

10/11/2023 Left MG diagnostic mammo LT wo CAD., [facility]. 3/20/2024 Right MG 3D diag mammo w/cad RT, [facility].

9/23/2024 Bilateral MG 3D diag mammo w/cad ENDY, [facility]. 





Tissue Density: 

Left: The breasts are heterogeneously dense, which may obscure small masses.





Findings: 

Analyzed By CAD. 

Multifocal Areas of dystrophic calcification and distortion along with surgical change and

distortion in the upper aspect left breast are all stable. Improved skin thickening is noted.



No obvious new suspicious mass or worrisome group of microcalcifications in the left breast. 





Overall Assessment: Benign, BI-RAD 2





Management: 

Diagnostic Mammogram of both breasts in 6 months.

Return to routine follow-up.  Results were given to the patient verbally at the time of exam.



Patient should continue monthly self-breast exams.  A clinical breast exam by your physician is

recommended on an annual basis.

This exam should not preclude additional follow-up of suspicious palpable abnormalities.





Note on Linda scores and lifetime risk:

1. A Linda score greater than 3% is considered moderate risk. If this is the case, consider

specialist referral to assess eligibility for a risk reducing agent.

2. If overall lifetime risk for the development of breast cancer is 20% or higher, the patient may

qualify for future screening with alternating mammogram and breast MRI.







[facility], Workstation: [id number], 3/21/2025 10:28 AM.



Electronically signed and approved by: [place]o [place] M.D.

## 2025-04-09 ENCOUNTER — HOSPITAL ENCOUNTER (OUTPATIENT)
Dept: HOSPITAL 47 - WWCWWP | Age: 70
End: 2025-04-09
Attending: SURGERY
Payer: MEDICARE

## 2025-04-09 VITALS
SYSTOLIC BLOOD PRESSURE: 155 MMHG | DIASTOLIC BLOOD PRESSURE: 84 MMHG | TEMPERATURE: 98.1 F | HEART RATE: 65 BPM | RESPIRATION RATE: 16 BRPM

## 2025-04-09 DIAGNOSIS — Z87.891: ICD-10-CM

## 2025-04-09 DIAGNOSIS — Z91.048: ICD-10-CM

## 2025-04-09 DIAGNOSIS — Z88.5: ICD-10-CM

## 2025-04-09 DIAGNOSIS — C50.912: ICD-10-CM

## 2025-04-09 DIAGNOSIS — Z12.31: Primary | ICD-10-CM
